# Patient Record
Sex: MALE | Race: WHITE | NOT HISPANIC OR LATINO | Employment: OTHER | URBAN - NONMETROPOLITAN AREA
[De-identification: names, ages, dates, MRNs, and addresses within clinical notes are randomized per-mention and may not be internally consistent; named-entity substitution may affect disease eponyms.]

---

## 2017-01-13 ENCOUNTER — ANTICOAGULATION VISIT (OUTPATIENT)
Dept: CARDIOLOGY | Facility: CLINIC | Age: 82
End: 2017-01-13

## 2017-01-13 LAB — INR PPP: 2.1 (ref 2–3)

## 2017-01-13 NOTE — PROGRESS NOTES
Per Huy Seals, PAC no dosage change, recheck level in 2-3 weeks.Patient's wife aware, verbalized ok. PH,LPN

## 2017-02-23 ENCOUNTER — DOCUMENTATION (OUTPATIENT)
Dept: CARDIOLOGY | Facility: CLINIC | Age: 82
End: 2017-02-23

## 2017-02-23 NOTE — PROGRESS NOTES
Called and spoke with wife, still in [place], to check on his INR and she stated he had had a fall recently and hurt some of his ribs, did labs and he was told they were all ok. Patient told her he would go breezy. And get INR drawn and he is to have results faxed to our office. PH,LPN

## 2017-02-24 ENCOUNTER — CONVERSION ENCOUNTER (OUTPATIENT)
Dept: CARDIOLOGY | Facility: CLINIC | Age: 82
End: 2017-02-24

## 2017-02-24 LAB — INR PPP: 1.7 (ref 2–3)

## 2017-02-25 LAB
INR PPP: 1.7 (ref 0.8–1.2)
PROTHROMBIN TIME: 17 SEC (ref 9.1–12)

## 2017-02-28 ENCOUNTER — ANTICOAGULATION VISIT (OUTPATIENT)
Dept: CARDIOLOGY | Facility: CLINIC | Age: 82
End: 2017-02-28

## 2017-02-28 NOTE — PROGRESS NOTES
INR 1.7 on 2-24-17 but never received results here in  Office, so called for results per patient inquiry. Per Huy Seals, PAC take 75 mg today then resume previous dosing and recheck level in 1 week. Patient's wife aware, verbalized ok. PH,LPN

## 2017-03-06 ENCOUNTER — CONVERSION ENCOUNTER (OUTPATIENT)
Dept: CARDIOLOGY | Facility: CLINIC | Age: 82
End: 2017-03-06

## 2017-03-06 DIAGNOSIS — Z95.2 AORTIC VALVE REPLACED: ICD-10-CM

## 2017-03-06 DIAGNOSIS — I48.91 ATRIAL FIBRILLATION, UNSPECIFIED TYPE (HCC): Primary | ICD-10-CM

## 2017-03-06 LAB — INR PPP: 1.7 (ref 2–3)

## 2017-03-07 ENCOUNTER — ANTICOAGULATION VISIT (OUTPATIENT)
Dept: CARDIOLOGY | Facility: CLINIC | Age: 82
End: 2017-03-07

## 2017-03-07 LAB
INR PPP: 1.7 (ref 0.8–1.2)
PROTHROMBIN TIME: 17.6 SEC (ref 9.1–12)

## 2017-03-07 NOTE — PROGRESS NOTES
Per Huy Seals, PAC change to 7.5 mg daily except 1/2 tab on thursdays and recheck level in 1 week. Patient's wife aware, verbalized ok. PH,LPN

## 2017-03-15 ENCOUNTER — CONVERSION ENCOUNTER (OUTPATIENT)
Dept: CARDIOLOGY | Facility: CLINIC | Age: 82
End: 2017-03-15

## 2017-03-15 LAB — INR PPP: 2.1 (ref 2–3)

## 2017-03-16 ENCOUNTER — ANTICOAGULATION VISIT (OUTPATIENT)
Dept: CARDIOLOGY | Facility: CLINIC | Age: 82
End: 2017-03-16

## 2017-03-16 LAB
INR PPP: 2.1 (ref 0.8–1.2)
PROTHROMBIN TIME: 21 SEC (ref 9.1–12)

## 2017-03-16 NOTE — PROGRESS NOTES
Per Huy Seals, PAC no dosage change, recheck level in 1-2 weeks. Patient's wife aware, verbalized ok. PH,LPN

## 2017-04-06 ENCOUNTER — CONVERSION ENCOUNTER (OUTPATIENT)
Dept: CARDIOLOGY | Facility: CLINIC | Age: 82
End: 2017-04-06

## 2017-04-06 LAB — INR PPP: 2.6 (ref 2–3)

## 2017-04-07 ENCOUNTER — ANTICOAGULATION VISIT (OUTPATIENT)
Dept: CARDIOLOGY | Facility: CLINIC | Age: 82
End: 2017-04-07

## 2017-04-07 LAB
INR PPP: 2.6 (ref 0.8–1.2)
PROTHROMBIN TIME: 25.8 SEC (ref 9.1–12)

## 2017-04-07 NOTE — PROGRESS NOTES
Per Huy Seals, PAC no dosage change, recheck level in 2-3 weeks. Patient aware, verbalized ok. Currently in [place], returning in May. PH,LPN

## 2017-04-13 ENCOUNTER — CONVERSION ENCOUNTER (OUTPATIENT)
Dept: CARDIOLOGY | Facility: CLINIC | Age: 82
End: 2017-04-13

## 2017-04-13 LAB — INR PPP: 2.2 (ref 2–3)

## 2017-04-14 LAB
INR PPP: 2.2 (ref 0.8–1.2)
PROTHROMBIN TIME: 22.4 SEC (ref 9.1–12)

## 2017-04-17 ENCOUNTER — ANTICOAGULATION VISIT (OUTPATIENT)
Dept: CARDIOLOGY | Facility: CLINIC | Age: 82
End: 2017-04-17

## 2017-04-17 NOTE — PROGRESS NOTES
Per Huy Seals, PAC. No dosage change, recheck level in 2-3 weeks. Patient aware, verbalized ok. PH,LPN

## 2017-05-16 LAB — INR PPP: 2.7 (ref 2–3)

## 2017-05-17 ENCOUNTER — ANTICOAGULATION VISIT (OUTPATIENT)
Dept: CARDIOLOGY | Facility: CLINIC | Age: 82
End: 2017-05-17

## 2017-05-17 LAB
INR PPP: 2.7 (ref 0.8–1.2)
PROTHROMBIN TIME: 27.3 SEC (ref 9.1–12)

## 2017-06-02 ENCOUNTER — OFFICE VISIT (OUTPATIENT)
Dept: CARDIOLOGY | Facility: CLINIC | Age: 82
End: 2017-06-02

## 2017-06-02 VITALS
SYSTOLIC BLOOD PRESSURE: 127 MMHG | HEART RATE: 86 BPM | DIASTOLIC BLOOD PRESSURE: 75 MMHG | BODY MASS INDEX: 29.98 KG/M2 | WEIGHT: 202.4 LBS | HEIGHT: 69 IN | OXYGEN SATURATION: 94 %

## 2017-06-02 DIAGNOSIS — I48.91 ATRIAL FIBRILLATION, UNSPECIFIED TYPE (HCC): ICD-10-CM

## 2017-06-02 DIAGNOSIS — R42 DIZZINESS: ICD-10-CM

## 2017-06-02 DIAGNOSIS — I49.5 TACHY-BRADY SYNDROME (HCC): Primary | ICD-10-CM

## 2017-06-02 DIAGNOSIS — I25.10 CORONARY ARTERY DISEASE DUE TO CALCIFIED CORONARY LESION: Primary | ICD-10-CM

## 2017-06-02 DIAGNOSIS — I25.10 CORONARY ARTERY DISEASE DUE TO CALCIFIED CORONARY LESION: ICD-10-CM

## 2017-06-02 DIAGNOSIS — Z95.2 S/P AVR (AORTIC VALVE REPLACEMENT): ICD-10-CM

## 2017-06-02 DIAGNOSIS — I10 ESSENTIAL HYPERTENSION: ICD-10-CM

## 2017-06-02 DIAGNOSIS — I48.20 CHRONIC ATRIAL FIBRILLATION (HCC): ICD-10-CM

## 2017-06-02 DIAGNOSIS — Z95.2 AORTIC VALVE REPLACED: ICD-10-CM

## 2017-06-02 DIAGNOSIS — I25.84 CORONARY ARTERY DISEASE DUE TO CALCIFIED CORONARY LESION: ICD-10-CM

## 2017-06-02 DIAGNOSIS — I25.84 CORONARY ARTERY DISEASE DUE TO CALCIFIED CORONARY LESION: Primary | ICD-10-CM

## 2017-06-02 PROBLEM — E78.5 HYPERLIPIDEMIA: Status: ACTIVE | Noted: 2017-06-02

## 2017-06-02 PROBLEM — R06.02 SHORTNESS OF BREATH: Status: ACTIVE | Noted: 2017-06-02

## 2017-06-02 PROBLEM — K57.90 DIVERTICULOSIS OF INTESTINE: Status: ACTIVE | Noted: 2017-06-02

## 2017-06-02 PROBLEM — K21.9 GASTROESOPHAGEAL REFLUX DISEASE: Status: ACTIVE | Noted: 2017-06-02

## 2017-06-02 PROBLEM — I77.9 DISORDER OF AORTA (HCC): Status: ACTIVE | Noted: 2017-06-02

## 2017-06-02 PROBLEM — I27.20 PULMONARY HYPERTENSION (HCC): Status: ACTIVE | Noted: 2017-06-02

## 2017-06-02 PROBLEM — R53.83 FATIGUE: Status: ACTIVE | Noted: 2017-06-02

## 2017-06-02 PROBLEM — R00.2 PALPITATIONS: Status: ACTIVE | Noted: 2017-06-02

## 2017-06-02 PROBLEM — I48.0 PAROXYSMAL ATRIAL FIBRILLATION (HCC): Status: ACTIVE | Noted: 2017-06-02

## 2017-06-02 PROBLEM — C61 MALIGNANT NEOPLASM OF PROSTATE (HCC): Status: ACTIVE | Noted: 2017-06-02

## 2017-06-02 PROCEDURE — 99213 OFFICE O/P EST LOW 20 MIN: CPT | Performed by: PHYSICIAN ASSISTANT

## 2017-06-02 PROCEDURE — 93288 INTERROG EVL PM/LDLS PM IP: CPT | Performed by: INTERNAL MEDICINE

## 2017-06-02 PROCEDURE — 93000 ELECTROCARDIOGRAM COMPLETE: CPT | Performed by: PHYSICIAN ASSISTANT

## 2017-06-02 RX ORDER — METHOCARBAMOL 750 MG/1
750 TABLET, FILM COATED ORAL AS NEEDED
COMMUNITY
Start: 2017-04-11 | End: 2017-10-24

## 2017-06-02 RX ORDER — OXYCODONE AND ACETAMINOPHEN 7.5; 325 MG/1; MG/1
TABLET ORAL
COMMUNITY
Start: 2017-05-23 | End: 2017-07-26 | Stop reason: ALTCHOICE

## 2017-06-02 NOTE — PROGRESS NOTES
Problem list     Subjective   Sonido Dodd is a 81 y.o. male     Chief Complaint   Patient presents with   • Dizziness     Problem List:     1. Coronary artery disease   1.1 Stenting to OM X 2 and LAD, 2007.  2. Atrial fibrillation   2.1 CHADS score of at least 2, patient is on anticoagulation therapy with Coumadin.  3. 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 Degenerative disc and joint disease.   5. Hypertension  6. Hyperlipidemia  7. Shortness of breath   8. Valvular Heart Disease.  8.1 Aortic valve replacement a #21 [employer] Trifecta valve.  The patient had concurrent VG to diagonal placement.      HPI  The patient presents back for evaluation after being out of the clinic for a period of approximately one to one and a half years.  He has not done as well recently.  He has started noticing increasing episodes of vertigo.  This is called a couple of separate falls which has now worsened his severe hip pain.  He also has dizziness compatible with orthostasis which seems far less significant to him that his vertigo.  He is planning to see Dr. Mejía for evaluation of his vertigo soon.  The patient did have a pacemaker interrogation today which indicated stable function.  He has approximately 9 years of battery life remaining.  The patient does have ongoing dyspnea which can be moderate at times.  He relates to no PND or orthopnea.  He has stable lower extremity edema.  He has no chest pain.  The patient has no further complaints otherwise at this time.    Current Outpatient Prescriptions   Medication Sig Dispense Refill   • amiodarone (PACERONE) 200 MG tablet Take 1 tablet by mouth Daily. 90 tablet 3   • aspirin 81 MG tablet Take 81 mg by mouth Daily.     • atorvastatin (LIPITOR) 40 MG tablet Take 1 tablet by mouth every night.     • methocarbamol (ROBAXIN) 750 MG tablet prn     • montelukast (SINGULAIR) 10 MG tablet Take 1 tablet by mouth daily.     • Multiple Vitamin (ONE-A-DAY  MENS) tablet Take 1 tablet by mouth daily.     • Omega-3 Fatty Acids (FISH OIL EXTRA STRENGTH) 1200 MG capsule Take  by mouth.     • oxyCODONE-acetaminophen (PERCOCET) 7.5-325 MG per tablet prn     • warfarin (COUMADIN) 7.5 MG tablet Take  by mouth. 7.5 mg daily except takes 1/2 tab on Thursday's       No current facility-administered medications for this visit.        Ondansetron    Past Medical History:   Diagnosis Date   • Cancer     malig. neoplasm of prostate   • Coronary artery disease    • GERD (gastroesophageal reflux disease)    • Hip discomfort     rt. hip s/p falls, spurs present per patient   • Hyperlipidemia    • Hypertension    • Pulmonary hypertension    • Transient cerebral ischemia        Social History     Social History   • Marital status:      Spouse name: N/A   • Number of children: N/A   • Years of education: N/A     Occupational History   • Not on file.     Social History Main Topics   • Smoking status: Never Smoker   • Smokeless tobacco: Not on file   • Alcohol use Defer      Comment: denied   • Drug use: No   • Sexual activity: Not on file     Other Topics Concern   • Not on file     Social History Narrative       Family History   Problem Relation Age of Onset   • Diabetes Mother    • Stroke Mother    • Cancer Father    • Cancer Sister    • Diabetes Brother    • Cancer Brother    • Other Brother      ACUTE MYOCARDIAL INFARCTION       Review of Systems   Constitutional: Positive for fatigue.   HENT: Positive for hearing loss.    Eyes: Positive for visual disturbance (reading glasses).   Respiratory: Negative.    Cardiovascular: Positive for leg swelling (mildly). Negative for chest pain and palpitations.   Gastrointestinal: Negative.    Endocrine: Negative.    Genitourinary: Negative.    Musculoskeletal: Positive for arthralgias, gait problem (rt. hip s/p falls, ambulates with walker) and myalgias.   Skin: Negative.    Allergic/Immunologic: Negative.    Neurological: Positive for  "dizziness and light-headedness.   Hematological: Bruises/bleeds easily.   Psychiatric/Behavioral: Positive for sleep disturbance.       Objective   /75 (BP Location: Right arm, Patient Position: Standing)  Pulse 86  Ht 69\" (175.3 cm)  Wt 202 lb 6.4 oz (91.8 kg)  SpO2 94%  BMI 29.89 kg/m2  Lab Results (most recent)     None        Physical Exam   Constitutional: He is oriented to person, place, and time. He appears well-developed and well-nourished. No distress.   HENT:   Head: Normocephalic and atraumatic.   Eyes: Conjunctivae and EOM are normal. Pupils are equal, round, and reactive to light.   Neck: Normal range of motion. Neck supple. No JVD present. No tracheal deviation present.   Cardiovascular: Normal rate, regular rhythm and intact distal pulses.    Murmur heard.   Systolic (Second RICS and LLSB.) murmur is present with a grade of 1/6   Pulses:       Dorsalis pedis pulses are 1+ on the right side, and 1+ on the left side.        Posterior tibial pulses are 1+ on the right side, and 1+ on the left side.   Pulmonary/Chest: Effort normal and breath sounds normal.   Abdominal: Soft. Bowel sounds are normal. He exhibits no distension and no mass. There is no tenderness. There is no rebound and no guarding.   Musculoskeletal: Normal range of motion. He exhibits edema. He exhibits no tenderness or deformity.   Neurological: He is alert and oriented to person, place, and time.   Skin: Skin is warm and dry. No rash noted. No erythema. No pallor.   Psychiatric: He has a normal mood and affect. His behavior is normal. Judgment and thought content normal.   Nursing note and vitals reviewed.        Procedure     ECG 12 Lead  Date/Time: 6/2/2017 9:51 AM  Performed by: CHAKA YBARRA  Authorized by: CHAKA YBARRA   Comments: Atrial pacing with ventricular sensing, normal axis, nonspecific ST and T-wave changes, no acute changes noted.               Assessment/Plan      Diagnosis Plan   1. Coronary artery " disease due to calcified coronary lesion  ECG 12 Lead    Adult Transthoracic Echo Complete    Duplex Carotid Ultrasound CAR   2. Essential hypertension  ECG 12 Lead    Adult Transthoracic Echo Complete    Duplex Carotid Ultrasound CAR   3. Atrial fibrillation, unspecified type  ECG 12 Lead    Adult Transthoracic Echo Complete    Duplex Carotid Ultrasound CAR   4. Dizziness  Adult Transthoracic Echo Complete    Duplex Carotid Ultrasound CAR   5. S/P AVR (aortic valve replacement)  Adult Transthoracic Echo Complete    Duplex Carotid Ultrasound CAR       I will schedule for an echo cardiogram to reevaluate aortic valve replacement, but especially given symptoms as above.  With dizziness and issues otherwise, the patient will be scheduled for carotid duplex.  I will make no changes in medications.  We'll see him back to review results the above studies and recommend further at that time.  Patient interrogation indicated stable function.  We will make no changes otherwise.

## 2017-06-27 DIAGNOSIS — Z95.2 AORTIC VALVE REPLACED: ICD-10-CM

## 2017-06-27 DIAGNOSIS — I48.91 ATRIAL FIBRILLATION, UNSPECIFIED TYPE (HCC): Primary | ICD-10-CM

## 2017-06-27 LAB — INR PPP: 1.8 (ref 2–3)

## 2017-06-28 ENCOUNTER — ANTICOAGULATION VISIT (OUTPATIENT)
Dept: CARDIOLOGY | Facility: CLINIC | Age: 82
End: 2017-06-28

## 2017-06-28 NOTE — PROGRESS NOTES
Per Huy Seals, PAC take extra 1/2 tab today (of 7.5) then resume previous dosing and recheck level in 2 weeks. Verbal orders read back and verified by Huy Seals, PAC. Patient aware, verbalized ok. PH,LPN

## 2017-07-03 ENCOUNTER — APPOINTMENT (OUTPATIENT)
Dept: CARDIOLOGY | Facility: HOSPITAL | Age: 82
End: 2017-07-03

## 2017-07-03 ENCOUNTER — HOSPITAL ENCOUNTER (OUTPATIENT)
Dept: CARDIOLOGY | Facility: HOSPITAL | Age: 82
End: 2017-07-03

## 2017-07-10 ENCOUNTER — APPOINTMENT (OUTPATIENT)
Dept: CARDIOLOGY | Facility: HOSPITAL | Age: 82
End: 2017-07-10

## 2017-07-12 ENCOUNTER — OUTSIDE FACILITY SERVICE (OUTPATIENT)
Dept: CARDIOLOGY | Facility: CLINIC | Age: 82
End: 2017-07-12

## 2017-07-12 ENCOUNTER — HOSPITAL ENCOUNTER (OUTPATIENT)
Dept: CARDIOLOGY | Facility: HOSPITAL | Age: 82
Discharge: HOME | End: 2017-07-12
Admitting: PHYSICIAN ASSISTANT

## 2017-07-12 ENCOUNTER — HOSPITAL ENCOUNTER (OUTPATIENT)
Dept: CARDIOLOGY | Facility: HOSPITAL | Age: 82
Discharge: HOME | End: 2017-07-12

## 2017-07-12 PROCEDURE — 93880 EXTRACRANIAL BILAT STUDY: CPT | Performed by: INTERNAL MEDICINE

## 2017-07-12 PROCEDURE — 93880 EXTRACRANIAL BILAT STUDY: CPT

## 2017-07-12 PROCEDURE — 93306 TTE W/DOPPLER COMPLETE: CPT | Performed by: INTERNAL MEDICINE

## 2017-07-12 PROCEDURE — 93306 TTE W/DOPPLER COMPLETE: CPT

## 2017-07-21 ENCOUNTER — TELEPHONE (OUTPATIENT)
Dept: CARDIOLOGY | Facility: CLINIC | Age: 82
End: 2017-07-21

## 2017-07-26 ENCOUNTER — OFFICE VISIT (OUTPATIENT)
Dept: CARDIOLOGY | Facility: CLINIC | Age: 82
End: 2017-07-26

## 2017-07-26 ENCOUNTER — ANTICOAGULATION VISIT (OUTPATIENT)
Dept: CARDIOLOGY | Facility: CLINIC | Age: 82
End: 2017-07-26

## 2017-07-26 VITALS
HEIGHT: 69 IN | HEART RATE: 85 BPM | OXYGEN SATURATION: 92 % | BODY MASS INDEX: 30.54 KG/M2 | DIASTOLIC BLOOD PRESSURE: 71 MMHG | WEIGHT: 206.2 LBS | SYSTOLIC BLOOD PRESSURE: 120 MMHG

## 2017-07-26 DIAGNOSIS — R06.02 SHORTNESS OF BREATH: ICD-10-CM

## 2017-07-26 DIAGNOSIS — Z95.2 S/P AVR (AORTIC VALVE REPLACEMENT): ICD-10-CM

## 2017-07-26 DIAGNOSIS — R53.83 OTHER FATIGUE: ICD-10-CM

## 2017-07-26 DIAGNOSIS — I25.10 CORONARY ARTERY DISEASE INVOLVING NATIVE CORONARY ARTERY OF NATIVE HEART WITHOUT ANGINA PECTORIS: Primary | ICD-10-CM

## 2017-07-26 LAB — INR PPP: 4.22 (ref 2–3)

## 2017-07-26 PROCEDURE — 99213 OFFICE O/P EST LOW 20 MIN: CPT | Performed by: PHYSICIAN ASSISTANT

## 2017-07-26 RX ORDER — HYDROCODONE BITARTRATE AND ACETAMINOPHEN 10; 325 MG/1; MG/1
1 TABLET ORAL AS NEEDED
COMMUNITY
Start: 2017-07-25 | End: 2018-06-06

## 2017-07-26 NOTE — PROGRESS NOTES
Problem list     Subjective   Sonido Dodd is a 81 y.o. male     Chief Complaint   Patient presents with   • Extremity Weakness     Legs     Problem List:      1. Coronary artery disease   1.1 Stenting to OM X 2 and LAD, 2007.  2. Atrial fibrillation   2.1 CHADS score of at least 2, patient is on anticoagulation therapy with Coumadin.  3. 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 Degenerative disc and joint disease.   5. Hypertension  6. Hyperlipidemia  7. Shortness of breath   8. Valvular Heart Disease.  8.1 Aortic valve replacement a #21 [employer] Trifecta valve.  The patient had concurrent VG to diagonal placement.      HPI  The patient presents back today at his request.  He presents because of significant muscle weakness and myalgias as well.  He reports that when trying to climb stairs or exert otherwise, he really has limitation because of muscle weakness.  He notes no significant myalgias, as above, which is more significant at night on average.  Strictly from cardiac standpoint, he denies chest pain.  He has stable dyspnea.  He relates to no PND or orthopnea.  He realized no rhythm disturbance issues.  He overall has done very well since aortic valve replacement as above.  The patient has no further complaints otherwise.    Current Outpatient Prescriptions   Medication Sig Dispense Refill   • amiodarone (PACERONE) 200 MG tablet Take 1 tablet by mouth Daily. 90 tablet 3   • aspirin 81 MG tablet Take 81 mg by mouth Daily.     • atorvastatin (LIPITOR) 40 MG tablet Take 1 tablet by mouth every night.     • HYDROcodone-acetaminophen (NORCO)  MG per tablet prn     • methocarbamol (ROBAXIN) 750 MG tablet prn     • montelukast (SINGULAIR) 10 MG tablet Take 1 tablet by mouth daily.     • Multiple Vitamin (ONE-A-DAY MENS) tablet Take 1 tablet by mouth daily.     • Omega-3 Fatty Acids (FISH OIL EXTRA STRENGTH) 1200 MG capsule Take  by mouth.     • warfarin (COUMADIN) 7.5 MG tablet  "Take  by mouth. 7.5 mg daily except takes 1/2 tab on Thursday's       No current facility-administered medications for this visit.        Ondansetron    Past Medical History:   Diagnosis Date   • Cancer     malig. neoplasm of prostate   • Coronary artery disease    • GERD (gastroesophageal reflux disease)    • Hip discomfort     rt. hip s/p falls, spurs present per patient   • Hyperlipidemia    • Hypertension    • Pulmonary hypertension    • Sleep apnea     uses c-pap   • Transient cerebral ischemia        Social History     Social History   • Marital status:      Spouse name: N/A   • Number of children: N/A   • Years of education: N/A     Occupational History   • Not on file.     Social History Main Topics   • Smoking status: Never Smoker   • Smokeless tobacco: Not on file   • Alcohol use Defer      Comment: denied   • Drug use: No   • Sexual activity: Not on file     Other Topics Concern   • Not on file     Social History Narrative       Family History   Problem Relation Age of Onset   • Diabetes Mother    • Stroke Mother    • Cancer Father    • Cancer Sister    • Diabetes Brother    • Cancer Brother    • Other Brother      ACUTE MYOCARDIAL INFARCTION       Review of Systems   Constitutional: Positive for fatigue.   HENT: Negative.    Eyes: Positive for visual disturbance (reading glasses and distance).   Respiratory: Positive for shortness of breath.    Cardiovascular: Negative.    Gastrointestinal: Negative.    Endocrine: Negative.    Genitourinary: Negative.    Musculoskeletal: Positive for arthralgias and myalgias.   Skin: Negative.    Allergic/Immunologic: Negative.    Neurological: Positive for dizziness, weakness (in legs) and light-headedness.   Hematological: Bruises/bleeds easily.   Psychiatric/Behavioral: Positive for sleep disturbance.       Objective   /71 (BP Location: Left arm, Patient Position: Sitting)  Pulse 85  Ht 69\" (175.3 cm)  Wt 206 lb 3.2 oz (93.5 kg)  SpO2 92%  BMI 30.45 " kg/m2  Lab Results (most recent)     None        Physical Exam   Constitutional: He is oriented to person, place, and time. He appears well-developed and well-nourished. No distress.   HENT:   Head: Normocephalic and atraumatic.   Eyes: Conjunctivae and EOM are normal. Pupils are equal, round, and reactive to light.   Neck: Normal range of motion. Neck supple. No JVD present. No tracheal deviation present.   Cardiovascular: Normal rate, regular rhythm and intact distal pulses.    Murmur heard.   Systolic (Second RICS and LLSB.) murmur is present with a grade of 1/6   Pulses:       Dorsalis pedis pulses are 1+ on the right side, and 1+ on the left side.        Posterior tibial pulses are 1+ on the right side, and 1+ on the left side.   Pulmonary/Chest: Effort normal and breath sounds normal.   Abdominal: Soft. Bowel sounds are normal. He exhibits no distension and no mass. There is no tenderness. There is no rebound and no guarding.   Musculoskeletal: Normal range of motion. He exhibits edema. He exhibits no tenderness or deformity.   Neurological: He is alert and oriented to person, place, and time.   Skin: Skin is warm and dry. No rash noted. No erythema. No pallor.   Psychiatric: He has a normal mood and affect. His behavior is normal. Judgment and thought content normal.   Nursing note and vitals reviewed.        Procedure   Procedures       Assessment/Plan      Diagnosis Plan   1. Coronary artery disease involving native coronary artery of native heart without angina pectoris  CBC & Differential    Comprehensive Metabolic Panel    TSH    CBC & Differential    Comprehensive Metabolic Panel    TSH   2. S/P AVR (aortic valve replacement)  CBC & Differential    Comprehensive Metabolic Panel    TSH    CBC & Differential    Comprehensive Metabolic Panel    TSH   3. Other fatigue  CBC & Differential    Comprehensive Metabolic Panel    TSH    CBC & Differential    Comprehensive Metabolic Panel    TSH   4. Shortness of  breath  CBC & Differential    Comprehensive Metabolic Panel    TSH    CBC & Differential    Comprehensive Metabolic Panel    TSH       I have asked that the patient hold statin therapy because of his degree of myalgias and muscle weakness.  He will call back in one to 2 weeks and report symptoms off of statin therapy.  If improvement, we will consider alternate statin therapy.  If no improvement is noted, he will resume previous Lipitor dosing and we will search for other causes of his muscle weakness.  In the interim, because weakness, I will schedule for labs as above.  Otherwise, recent studies including echo and carotid duplex were unremarkable.  He had a normally functioning aortic valve replacement.  Systolic function was normal by echo.  Carotid duplex indicated nonobstructive disease bilaterally, which we will be treating him medically.  Further pending all the above.

## 2017-07-26 NOTE — PROGRESS NOTES
Per Huy Seals, PAC hold today's dose then resume previous dosing and recheck level in 2 weeks. Verbal orders read back and verified by Huy Seals, PAC. Patient aware, verbalized ok. PH,LPN

## 2017-08-15 ENCOUNTER — ANTICOAGULATION VISIT (OUTPATIENT)
Dept: CARDIOLOGY | Facility: CLINIC | Age: 82
End: 2017-08-15

## 2017-08-15 LAB — INR PPP: 2.7 (ref 2–3)

## 2017-08-15 NOTE — PROGRESS NOTES
Per Huy Seals, PAC no dosage change, recheck level in 2 weeks. Verbal orders read back and verified by LINDSEY Briceno. Patient aware, verbalized ok. PH,LPN

## 2017-09-06 ENCOUNTER — OFFICE VISIT (OUTPATIENT)
Dept: CARDIOLOGY | Facility: CLINIC | Age: 82
End: 2017-09-06

## 2017-09-06 VITALS
OXYGEN SATURATION: 94 % | HEART RATE: 85 BPM | DIASTOLIC BLOOD PRESSURE: 71 MMHG | WEIGHT: 211.2 LBS | BODY MASS INDEX: 31.28 KG/M2 | SYSTOLIC BLOOD PRESSURE: 119 MMHG | HEIGHT: 69 IN

## 2017-09-06 DIAGNOSIS — Z95.0 PACEMAKER: ICD-10-CM

## 2017-09-06 DIAGNOSIS — Z95.2 S/P AVR (AORTIC VALVE REPLACEMENT): ICD-10-CM

## 2017-09-06 DIAGNOSIS — I10 ESSENTIAL HYPERTENSION: ICD-10-CM

## 2017-09-06 DIAGNOSIS — I48.0 PAROXYSMAL ATRIAL FIBRILLATION (HCC): Primary | ICD-10-CM

## 2017-09-06 PROCEDURE — 99213 OFFICE O/P EST LOW 20 MIN: CPT | Performed by: PHYSICIAN ASSISTANT

## 2017-09-06 NOTE — PROGRESS NOTES
Problem list     Subjective   Sonido Dodd is a 81 y.o. male     Chief Complaint   Patient presents with   • Follow-up     patient appears in office today for test results (carotid/echo) ; patient denies CP/Palpitations   • Shortness of Breath     patient states he has SOA on exertion only   Problem List:      1. Coronary artery disease   1.1 Stenting to OM X 2 and LAD, 2007.  2. Atrial fibrillation   2.1 CHADS score of at least 2, patient is on anticoagulation therapy with Coumadin.  3. 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 Degenerative disc and joint disease.   5. Hypertension  6. Hyperlipidemia  7. Shortness of breath   8. Valvular Heart Disease.  8.1 Aortic valve replacement a #21 [employer] Trifecta valve.  The patient had concurrent VG to diagonal placement.        HPI  The patient presents back for follow-up of echo and carotid duplex findings.  Echo indicated preserved systolic function with normally functioning aortic valve replacement.  Parameters are stable for this gentleman otherwise.  Carotid duplex suggested nonobstructive disease in either carotid systems.  The patient held statin therapy after last appointment and had no improvement in his weakness.  He has one had restarted that.  Currently, the patient has no chest pain.  He reports stable dyspnea.  He has no symptoms of dysrhythmias.  He feels that he is doing well otherwise and has no further complaints.    Current Outpatient Prescriptions   Medication Sig Dispense Refill   • amiodarone (PACERONE) 200 MG tablet Take 1 tablet by mouth Daily. 90 tablet 3   • aspirin 81 MG tablet Take 81 mg by mouth Daily.     • HYDROcodone-acetaminophen (NORCO)  MG per tablet Take 1 tablet by mouth As Needed for Severe Pain  (pain only). prn     • methocarbamol (ROBAXIN) 750 MG tablet Take 750 mg by mouth As Needed. prn     • Multiple Vitamin (ONE-A-DAY MENS) tablet Take 1 tablet by mouth daily.     • Omega-3 Fatty Acids  (FISH OIL EXTRA STRENGTH) 1200 MG capsule Take  by mouth.     • warfarin (COUMADIN) 7.5 MG tablet Take  by mouth. 7.5 mg daily except takes 1/2 tab on Thursday's       No current facility-administered medications for this visit.        Ondansetron    Past Medical History:   Diagnosis Date   • Cancer     malig. neoplasm of prostate   • Coronary artery disease    • GERD (gastroesophageal reflux disease)    • Hip discomfort     rt. hip s/p falls, spurs present per patient   • Hyperlipidemia    • Hypertension    • Pulmonary hypertension    • Sleep apnea     uses c-pap   • Transient cerebral ischemia        Social History     Social History   • Marital status:      Spouse name: N/A   • Number of children: N/A   • Years of education: N/A     Occupational History   • Not on file.     Social History Main Topics   • Smoking status: Never Smoker   • Smokeless tobacco: Never Used   • Alcohol use Defer      Comment: denied   • Drug use: No   • Sexual activity: Defer     Other Topics Concern   • Not on file     Social History Narrative       Family History   Problem Relation Age of Onset   • Diabetes Mother    • Stroke Mother    • Cancer Father    • Cancer Sister    • Diabetes Brother    • Cancer Brother    • Other Brother      ACUTE MYOCARDIAL INFARCTION       Review of Systems   Constitutional: Negative.  Negative for fatigue.   HENT: Negative.    Eyes: Positive for visual disturbance (wears glasses PRN).   Respiratory: Positive for apnea (SHAUNA with CPAP use) and shortness of breath (on exertion only). Negative for cough, chest tightness and wheezing.    Cardiovascular: Negative.  Negative for chest pain (denies CP), palpitations (denies palpitations) and leg swelling.   Gastrointestinal: Negative.  Negative for abdominal pain, nausea and vomiting.   Endocrine: Negative.  Negative for cold intolerance, heat intolerance, polyphagia and polyuria.   Genitourinary: Negative.  Negative for difficulty urinating, frequency and  "urgency.   Musculoskeletal: Positive for arthralgias (back) and back pain (due to arthritis). Negative for myalgias, neck pain and neck stiffness.   Skin: Negative.  Negative for rash and wound.   Allergic/Immunologic: Positive for environmental allergies (seasonal allergies). Negative for food allergies.   Neurological: Negative.  Negative for dizziness, weakness, light-headedness and headaches.   Hematological: Bruises/bleeds easily (bruises and bleeds easily).   Psychiatric/Behavioral: Negative for agitation, confusion and sleep disturbance (denies waking up smothering). The patient is not nervous/anxious.        Objective   /71 (BP Location: Left arm, Patient Position: Sitting)  Pulse 85  Ht 69\" (175.3 cm)  Wt 211 lb 3.2 oz (95.8 kg)  SpO2 94%  BMI 31.19 kg/m2  Lab Results (most recent)     None        Physical Exam   Constitutional: He is oriented to person, place, and time. He appears well-developed and well-nourished. No distress.   HENT:   Head: Normocephalic and atraumatic.   Eyes: Conjunctivae and EOM are normal. Pupils are equal, round, and reactive to light.   Neck: Normal range of motion. Neck supple. No JVD present. No tracheal deviation present.   Cardiovascular: Normal rate, regular rhythm and intact distal pulses.    Murmur heard.   Systolic (Second RICS and LLSB.) murmur is present with a grade of 1/6   Pulses:       Dorsalis pedis pulses are 1+ on the right side, and 1+ on the left side.        Posterior tibial pulses are 1+ on the right side, and 1+ on the left side.   Pulmonary/Chest: Effort normal and breath sounds normal.   Abdominal: Soft. Bowel sounds are normal. He exhibits no distension and no mass. There is no tenderness. There is no rebound and no guarding.   Musculoskeletal: Normal range of motion. He exhibits edema. He exhibits no tenderness or deformity.   Neurological: He is alert and oriented to person, place, and time.   Skin: Skin is warm and dry. No rash noted. No " erythema. No pallor.   Psychiatric: He has a normal mood and affect. His behavior is normal. Judgment and thought content normal.   Nursing note and vitals reviewed.        Procedure   Procedures       Assessment/Plan      Diagnosis Plan   1. Paroxysmal atrial fibrillation     2. S/P AVR (aortic valve replacement)     3. Pacemaker     4. Essential hypertension       The patient seems to be doing well.  Echo findings suggest normally functioning aortic valve replacement.  EF is normal.  He has stable findings otherwise.  Carotid duplex indicates nonobstructive disease and bilateral carotid systems.  Last pacemaker interrogation indicates normally functioning pacemaker.  The patient is on appropriate medications.  He seems to be feeling well at this time from cardiac standpoint.  I will make no changes and see the patient back in 6 months, sooner if indicated by course.

## 2017-09-07 ENCOUNTER — TELEPHONE (OUTPATIENT)
Dept: CARDIOLOGY | Facility: CLINIC | Age: 82
End: 2017-09-07

## 2017-09-07 DIAGNOSIS — I48.0 PAROXYSMAL ATRIAL FIBRILLATION (HCC): Primary | ICD-10-CM

## 2017-09-07 DIAGNOSIS — I25.10 CORONARY ARTERY DISEASE INVOLVING NATIVE CORONARY ARTERY OF NATIVE HEART WITHOUT ANGINA PECTORIS: ICD-10-CM

## 2017-09-07 DIAGNOSIS — Z79.01 ANTICOAGULATED ON COUMADIN: ICD-10-CM

## 2017-09-07 NOTE — TELEPHONE ENCOUNTER
----- Message from Zane Weller sent at 9/7/2017 10:36 AM EDT -----  Contact: EVELYN  PATIENT WAS SEEN BY CHAKA YBARRA ON 9/6/17 AND FAILED TO GET A STANDING ORDER FOR HIS COUMADIN TO BE CHECKED AND IS REQUESTING A LAB ORDER.  HE WOULD LIKE THE ORDER TO BE MAILED TO HIM.    EVELYN RHODES  [address] [place] [address]    IF ANY QUESTIONS PLEASE CALL EVELYN [phone]

## 2017-09-25 ENCOUNTER — DOCUMENTATION (OUTPATIENT)
Dept: CARDIOLOGY | Facility: CLINIC | Age: 82
End: 2017-09-25

## 2017-09-25 NOTE — PROGRESS NOTES
Called and spoke with wife and reminded her iNR way past due and she stated she would get him to have it drawn. PH,LPN

## 2017-09-27 LAB — INR PPP: 2.4 (ref 2–3)

## 2017-09-28 ENCOUNTER — ANTICOAGULATION VISIT (OUTPATIENT)
Dept: CARDIOLOGY | Facility: CLINIC | Age: 82
End: 2017-09-28

## 2017-09-28 NOTE — PROGRESS NOTES
NO DOSAGE CHANGE AND RECHECK LEVEL IN 3 WEEKS PER CHAKA YBARRA, PAC. VERBAL ORDERS READ BACK AND VERIFIED BY CHAKA YBARRA, PAC. PATIENT AWARE VERBALIZED OK. PH,LPN

## 2017-10-24 ENCOUNTER — OFFICE VISIT (OUTPATIENT)
Dept: CARDIOLOGY | Facility: CLINIC | Age: 82
End: 2017-10-24

## 2017-10-24 VITALS
HEART RATE: 85 BPM | WEIGHT: 214.4 LBS | HEIGHT: 69 IN | SYSTOLIC BLOOD PRESSURE: 164 MMHG | BODY MASS INDEX: 31.76 KG/M2 | DIASTOLIC BLOOD PRESSURE: 88 MMHG | OXYGEN SATURATION: 95 %

## 2017-10-24 DIAGNOSIS — Z95.2 S/P AVR (AORTIC VALVE REPLACEMENT): ICD-10-CM

## 2017-10-24 DIAGNOSIS — R00.2 PALPITATIONS: ICD-10-CM

## 2017-10-24 DIAGNOSIS — E78.5 HYPERLIPIDEMIA, UNSPECIFIED HYPERLIPIDEMIA TYPE: ICD-10-CM

## 2017-10-24 DIAGNOSIS — Z95.0 PACEMAKER: ICD-10-CM

## 2017-10-24 DIAGNOSIS — I25.10 TRIPLE VESSEL CORONARY ARTERY DISEASE: Primary | ICD-10-CM

## 2017-10-24 DIAGNOSIS — I48.0 PAROXYSMAL ATRIAL FIBRILLATION (HCC): ICD-10-CM

## 2017-10-24 DIAGNOSIS — R06.02 SHORTNESS OF BREATH: ICD-10-CM

## 2017-10-24 LAB — INR PPP: 2.2 (ref 2–3)

## 2017-10-24 PROCEDURE — 99214 OFFICE O/P EST MOD 30 MIN: CPT | Performed by: NURSE PRACTITIONER

## 2017-10-24 RX ORDER — ATORVASTATIN CALCIUM 40 MG/1
40 TABLET, FILM COATED ORAL DAILY
COMMUNITY
Start: 2017-09-15 | End: 2020-01-01 | Stop reason: ALTCHOICE

## 2017-10-24 RX ORDER — LEVOTHYROXINE SODIUM 0.05 MG/1
50 TABLET ORAL DAILY
COMMUNITY
Start: 2017-09-15

## 2017-10-24 RX ORDER — MONTELUKAST SODIUM 10 MG/1
10 TABLET ORAL DAILY
COMMUNITY
Start: 2017-09-15

## 2017-10-24 NOTE — PROGRESS NOTES
Subjective   Sonido Dodd is a 81 y.o. male     Chief Complaint   Patient presents with   • Atrial Fibrillation     patient states he saw PCP this AM and received EKG ; PCP notified patient he was in atrial fibrillation    • Palpitations     patient states he has noticed increase in palpitations       HPI    Problem List:      1. Coronary artery disease   1.1 Stenting to OM X 2 and LAD, 2007.  2. Atrial fibrillation   2.1 CHADS score of at least 2, patient is on anticoagulation therapy with Coumadin.  3. Conduction system disease   3.1 PPM Dual Chamber [facility] F 2/2/16  4. Degenerative disc and joint disease.   5. Hypertension  6. Hyperlipidemia  7. Shortness of breath   7.1 Echo 7/12/17-moderate LVH, EF greater than 65%, diastolic dysfunction 2, aortic VTI 1.2; porcine bioprosthetic aortic valve is present; that was appears to function normally; trace MR  8. Valvular Heart Disease.  8.1 Aortic valve replacement a #21 [facility] F Trifecta valve.  The patient had concurrent VG to diagonal placement.   9.  Carotid artery disease  9.1 carotid artery ultrasound 7/12/17-16-49% stenosis mild bifurcation bilaterally, less than or equal to 15% stenosis R ICA, 16-49% stenosis and LICA, antegrade flow disturbance in both her tumor arteries    Patient is an 81-year-old male who presents today for follow-up due to EKG at PCPs office.  He denies any chest pain or pressure.  He says he's had increase in palpitations since over the weekend.  He says he gets a little into his back.  He denies any dizziness, presyncope, syncope, orthopnea, PND or edema.  He says he has had increase in shortness of breath with activity.  He denies any signs of bleeding or cerebral ischemic events.    Patient has been on amiodarone for his A. fib however he had elevated thyroid level which he has been put on levothyroxin for however his viral level still slightly elevated at 6.050.  Due to this we will try to get him off of the amiodarone and onto  flecainide.  EKG from his PCP axis showed sinus rhythm with PVCs.    Current Outpatient Prescriptions   Medication Sig Dispense Refill   • aspirin 81 MG tablet Take 81 mg by mouth Daily.     • atorvastatin (LIPITOR) 40 MG tablet Take 40 mg by mouth Daily.     • HYDROcodone-acetaminophen (NORCO)  MG per tablet Take 1 tablet by mouth As Needed for Severe Pain  (pain only). prn     • levothyroxine (SYNTHROID, LEVOTHROID) 50 MCG tablet Take 50 mcg by mouth Daily.     • montelukast (SINGULAIR) 10 MG tablet Take 10 mg by mouth Daily.     • Multiple Vitamin (ONE-A-DAY MENS) tablet Take 1 tablet by mouth daily.     • Omega-3 Fatty Acids (FISH OIL EXTRA STRENGTH) 1200 MG capsule Take  by mouth.     • warfarin (COUMADIN) 7.5 MG tablet Take  by mouth. 7.5 mg daily except takes 1/2 tab on Thursday's       No current facility-administered medications for this visit.        ALLERGIES    Ondansetron    Past Medical History:   Diagnosis Date   • Cancer     malig. neoplasm of prostate   • Coronary artery disease    • GERD (gastroesophageal reflux disease)    • Hip discomfort     rt. hip s/p falls, spurs present per patient   • Hyperlipidemia    • Hypertension    • Pulmonary hypertension    • Sleep apnea     uses c-pap   • Transient cerebral ischemia        Social History     Social History   • Marital status:      Spouse name: N/A   • Number of children: N/A   • Years of education: N/A     Occupational History   • Not on file.     Social History Main Topics   • Smoking status: Never Smoker   • Smokeless tobacco: Never Used   • Alcohol use Defer      Comment: denied   • Drug use: No   • Sexual activity: Defer     Other Topics Concern   • Not on file     Social History Narrative       Family History   Problem Relation Age of Onset   • Diabetes Mother    • Stroke Mother    • Cancer Father    • Cancer Sister    • Diabetes Brother    • Cancer Brother    • Other Brother      ACUTE MYOCARDIAL INFARCTION       Review of Systems  "  Constitutional: Negative for diaphoresis and fatigue.   HENT: Positive for rhinorrhea (due to seasonal allergies). Negative for congestion, sinus pain, sinus pressure, sneezing and sore throat.    Eyes: Positive for visual disturbance (wears reading glasses).   Respiratory: Positive for shortness of breath (easily SOA). Negative for cough, chest tightness and wheezing.    Cardiovascular: Positive for palpitations (increase in palpitations). Negative for chest pain (denies CP) and leg swelling.   Gastrointestinal: Negative for abdominal pain, nausea and vomiting.   Endocrine: Negative for cold intolerance, heat intolerance, polyphagia and polyuria.   Genitourinary: Negative for difficulty urinating, frequency and urgency.   Musculoskeletal: Positive for arthralgias (hands). Negative for back pain, myalgias, neck pain and neck stiffness.   Skin: Negative.  Negative for rash and wound.   Allergic/Immunologic: Negative for environmental allergies and food allergies.   Neurological: Negative for dizziness, weakness, light-headedness and headaches.   Hematological: Bruises/bleeds easily (bruises and bleeds easily).   Psychiatric/Behavioral: Negative for agitation, confusion and sleep disturbance. The patient is nervous/anxious (occasional nervous/anxiousness).        Objective   /88 (BP Location: Left arm, Patient Position: Sitting)  Pulse 85  Ht 69\" (175.3 cm)  Wt 214 lb 6.4 oz (97.3 kg)  SpO2 95%  BMI 31.66 kg/m2  Vitals:    10/24/17 0829   BP: 164/88   BP Location: Left arm   Patient Position: Sitting   Pulse: 85   SpO2: 95%   Weight: 214 lb 6.4 oz (97.3 kg)   Height: 69\" (175.3 cm)      Lab Results (most recent)     None        Physical Exam   Constitutional: He is oriented to person, place, and time. Vital signs are normal. He appears well-developed and well-nourished. He is active and cooperative.   HENT:   Head: Normocephalic.   Eyes: Lids are normal.   Neck: Normal carotid pulses, no hepatojugular " reflux and no JVD present. Carotid bruit is not present.   Cardiovascular: Normal rate and regular rhythm.   Occasional extrasystoles are present.   Murmur heard.   Systolic (LLSB) murmur is present   Pulses:       Radial pulses are 2+ on the right side, and 2+ on the left side.        Dorsalis pedis pulses are 2+ on the right side, and 2+ on the left side.        Posterior tibial pulses are 2+ on the right side, and 2+ on the left side.   No edema BLE.    Pulmonary/Chest: Effort normal and breath sounds normal.   Abdominal: Normal appearance and bowel sounds are normal.   Neurological: He is alert and oriented to person, place, and time.   Skin: Skin is warm, dry and intact.   PPM ROSELINE    Psychiatric: He has a normal mood and affect. His speech is normal and behavior is normal. Judgment and thought content normal. Cognition and memory are normal.       Procedure   Procedures         Assessment/Plan      Diagnosis Plan   1. Triple vessel coronary artery disease  Stress Test With Myocardial Perfusion One Day    Stress Test With Myocardial Perfusion One Day   2. Paroxysmal atrial fibrillation  Stress Test With Myocardial Perfusion One Day    Stress Test With Myocardial Perfusion One Day   3. S/P AVR (aortic valve replacement)     4. Pacemaker     5. Hyperlipidemia, unspecified hyperlipidemia type  Stress Test With Myocardial Perfusion One Day    Stress Test With Myocardial Perfusion One Day   6. Palpitations     7. Shortness of breath         Return Nurse Visit 2 Weeks on [facility] EKG/VS.    CAD-patient is on aspirin and statin.  CAD/A. fib/hyperlipidemia/shortness of breath/palpitations/shortness of breath-patient will have stress test.  Need to rule out ischemia prior to starting on flecainide.  A. fib-patient takes amiodarone at night so he has not taken it yet today.  He will stop taking amiodarone as of today and he will come back in 2 weeks for nurse visit for EKG and vital signs.  His point we should hopefully have  the results of the stress test and start him on flecainide.  I did advise will also recheck his thyroid level once amiodarone is out of his system to make sure he still needs medication.  Hyperlipidemia-patient is on Lipitor monitor by PCP.  He did bring in lab work today and it was excellent.  Patient will continue his medication regimen otherwise for now.  We will make his regular follow-up after he has his nurse visit in 2 weeks.     EKG from Dr. Reed office was NSR with PVCs, not afib.

## 2017-10-24 NOTE — PATIENT INSTRUCTIONS
Atrial Fibrillation  Atrial fibrillation is a type of irregular or rapid heartbeat (arrhythmia). In atrial fibrillation, the heart quivers continuously in a chaotic pattern. This occurs when parts of the heart receive disorganized signals that make the heart unable to pump blood normally. This can increase the risk for stroke, heart failure, and other heart-related conditions. There are different types of atrial fibrillation, including:  · Paroxysmal atrial fibrillation. This type starts suddenly, and it usually stops on its own shortly after it starts.  · Persistent atrial fibrillation. This type often lasts longer than a week. It may stop on its own or with treatment.  · Long-lasting persistent atrial fibrillation. This type lasts longer than 12 months.  · Permanent atrial fibrillation. This type does not go away.  Talk with your health care provider to learn about the type of atrial fibrillation that you have.  CAUSES  This condition is caused by some heart-related conditions or procedures, including:  · A heart attack.  · Coronary artery disease.  · Heart failure.  · Heart valve conditions.  · High blood pressure.  · Inflammation of the sac that surrounds the heart (pericarditis).  · Heart surgery.  · Certain heart rhythm disorders, such as Powers-Parkinson-White syndrome.  Other causes include:  · Pneumonia.  · Obstructive sleep apnea.  · Blockage of an artery in the lungs (pulmonary embolism, or PE).  · Lung cancer.  · Chronic lung disease.  · Thyroid problems, especially if the thyroid is overactive (hyperthyroidism).  · Caffeine.  · Excessive alcohol use or illegal drug use.  · Use of some medicines, including certain decongestants and diet pills.  Sometimes, the cause cannot be found.  RISK FACTORS  This condition is more likely to develop in:  · People who are older in age.  · People who smoke.  · People who have diabetes mellitus.  · People who are overweight (obese).  · Athletes who exercise  vigorously.  SYMPTOMS  Symptoms of this condition include:  · A feeling that your heart is beating rapidly or irregularly.  · A feeling of discomfort or pain in your chest.  · Shortness of breath.  · Sudden light-headedness or weakness.  · Getting tired easily during exercise.  In some cases, there are no symptoms.  DIAGNOSIS  Your health care provider may be able to detect atrial fibrillation when taking your pulse. If detected, this condition may be diagnosed with:  · An electrocardiogram (ECG).  · A Holter monitor test that records your heartbeat patterns over a 24-hour period.  · Transthoracic echocardiogram (TTE) to evaluate how blood flows through your heart.  · Transesophageal echocardiogram (JACQUELINE) to view more detailed images of your heart.  · A stress test.  · Imaging tests, such as a CT scan or chest X-ray.  · Blood tests.  TREATMENT  The main goals of treatment are to prevent blood clots from forming and to keep your heart beating at a normal rate and rhythm. The type of treatment that you receive depends on many factors, such as your underlying medical conditions and how you feel when you are experiencing atrial fibrillation.  This condition may be treated with:  · Medicine to slow down the heart rate, bring the heart's rhythm back to normal, or prevent clots from forming.  · Electrical cardioversion. This is a procedure that resets your heart's rhythm by delivering a controlled, low-energy shock to the heart through your skin.  · Different types of ablation, such as catheter ablation, catheter ablation with pacemaker, or surgical ablation. These procedures destroy the heart tissues that send abnormal signals. When the pacemaker is used, it is placed under your skin to help your heart beat in a regular rhythm.  HOME CARE INSTRUCTIONS  · Take over-the counter and prescription medicines only as told by your health care provider.  · If your health care provider prescribed a blood-thinning medicine  (anticoagulant), take it exactly as told. Taking too much blood-thinning medicine can cause bleeding. If you do not take enough blood-thinning medicine, you will not have the protection that you need against stroke and other problems.  · Do not use tobacco products, including cigarettes, chewing tobacco, and e-cigarettes. If you need help quitting, ask your health care provider.  · If you have obstructive sleep apnea, manage your condition as told by your health care provider.  · Do not drink alcohol.  · Do not drink beverages that contain caffeine, such as coffee, soda, and tea.  · Maintain a healthy weight. Do not use diet pills unless your health care provider approves. Diet pills may make heart problems worse.  · Follow diet instructions as told by your health care provider.  · Exercise regularly as told by your health care provider.  · Keep all follow-up visits as told by your health care provider. This is important.  PREVENTION  · Avoid drinking beverages that contain caffeine or alcohol.  · Avoid certain medicines, especially medicines that are used for breathing problems.  · Avoid certain herbs and herbal medicines, such as those that contain ephedra or ginseng.  · Do not use illegal drugs, such as cocaine and amphetamines.  · Do not smoke.  · Manage your high blood pressure.  SEEK MEDICAL CARE IF:  · You notice a change in the rate, rhythm, or strength of your heartbeat.  · You are taking an anticoagulant and you notice increased bruising.  · You tire more easily when you exercise or exert yourself.  SEEK IMMEDIATE MEDICAL CARE IF:  · You have chest pain, abdominal pain, sweating, or weakness.  · You feel nauseous.  · You notice blood in your vomit, bowel movement, or urine.  · You have shortness of breath.  · You suddenly have swollen feet and ankles.  · You feel dizzy.  · You have sudden weakness or numbness of the face, arm, or leg, especially on one side of the body.  · You have trouble speaking,  trouble understanding, or both (aphasia).  · Your face or your eyelid droops on one side.  These symptoms may represent a serious problem that is an emergency. Do not wait to see if the symptoms will go away. Get medical help right away. Call your local emergency services (911 in the [place] Do not drive yourself to the hospital.     This information is not intended to replace advice given to you by your health care provider. Make sure you discuss any questions you have with your health care provider.     Document Released: 12/18/2006 Document Revised: 09/07/2016 Document Reviewed: 04/13/2016  [employer] Interactive Patient Education ©2017 [employer]  Premature Atrial Contraction  Premature atrial contractions (PACs) happen when your heart beats before it has had time to fill with blood. Your heart then has to pause until it can fill with blood for the next beat. This causes the next beat to be more forceful. PACs are also called skipped heartbeats because it may feel like your heart stops for a second.   Your heart has four chambers. There are two upper chambers (atria) and two lower chambers (ventricles). All the chambers need to work together to pump blood properly. Electrical signals spread across your heart and make all the chambers beat together. The signal to beat starts in your atria. If the atria fire a bit early, you may have a PAC.   CAUSES   The cause of a PAC is often unknown. PACs are sometimes caused by heart disease or injury.  RISK FACTORS  PACs are more common in children and older people. Other risk factors that may trigger PACs include:  · Caffeine.  · Stress.  · Fatigue.  · Alcohol.  · Smoking.  · Stimulant drugs. These may be prescription or illegal drugs.  · Heart disease.  SIGNS AND SYMPTOMS  PACs are very common, especially in children and people 50 years and older. PACs do not cause dizziness, shortness of breath, or chest pain. The only symptom of a PAC is the sensation of a skipped or  fluttering heartbeat.   DIAGNOSIS   Your health care provider can diagnose PAC based on the description of your symptom. Your health care provider may also:  · Perform a physical exam to listen to your heart. Your heart may sound normal during this exam.  · Perform tests to rule out other conditions. These tests may include an electrical tracing of your heart called electrocardiogram (ECG). You may need to wear a portable ECG machine (Holter monitor) that records your heart for 24 hours or more.  TREATMENT   In most cases, PACs do not need to be treated. If you have frequent PACs that are caused by heart disease, you may be treated for the underlying condition.  HOME CARE INSTRUCTIONS  · Do not use any tobacco products, including cigarettes, chewing tobacco, or electronic cigarettes. If you need help quitting, ask your health care provider.  · Limit alcohol intake to no more than 1 drink per day for nonpregnant women and 2 drinks per day for men. One drink equals 12 ounces of beer, 5 ounces of wine, or 1½ ounces of hard liquor.  · Limit the amount of caffeine you take in.  · Do not use illegal drugs.  · Get at least 8 hours of sleep every night.  · Find healthy ways to manage stress.  · Get regular exercise. Ask your health care provider to suggest some activities that are safe for you.  SEEK MEDICAL CARE IF:  · You feel your heart skipping beats often (more than once a day).  · Your heart skips beats and you feel dizzy, lightheaded, or very tired.  SEEK IMMEDIATE MEDICAL CARE IF:   · You have chest pain.  · You have trouble breathing.     This information is not intended to replace advice given to you by your health care provider. Make sure you discuss any questions you have with your health care provider.     Document Released: 08/21/2015 Document Revised: 05/03/2016 Document Reviewed: 08/21/2015  [employer] Interactive Patient Education ©2017 [employer]

## 2017-10-27 ENCOUNTER — ANTICOAGULATION VISIT (OUTPATIENT)
Dept: CARDIOLOGY | Facility: CLINIC | Age: 82
End: 2017-10-27

## 2017-10-27 NOTE — PROGRESS NOTES
NO DOSAGE CHANGE AND RECHECK LEVEL IN 3 WEEKS PER CHAKA YBARRA PAC. VERBAL ORDERS READ BACK AND VERIFIED BY LINDSEY NARAYAN. PATIENT AWARE, ABOVE ADDRESSED WITH PROVIDER BY LIZETH MART. PH,LPN

## 2017-10-31 ENCOUNTER — APPOINTMENT (OUTPATIENT)
Dept: CARDIOLOGY | Facility: HOSPITAL | Age: 82
End: 2017-10-31

## 2017-11-17 ENCOUNTER — OFFICE VISIT (OUTPATIENT)
Dept: CARDIOLOGY | Facility: CLINIC | Age: 82
End: 2017-11-17

## 2017-11-17 DIAGNOSIS — I49.5 TACHY-BRADY SYNDROME (HCC): Primary | ICD-10-CM

## 2017-11-17 PROCEDURE — 93288 INTERROG EVL PM/LDLS PM IP: CPT | Performed by: INTERNAL MEDICINE

## 2017-11-20 ENCOUNTER — APPOINTMENT (OUTPATIENT)
Dept: CARDIOLOGY | Facility: HOSPITAL | Age: 82
End: 2017-11-20

## 2017-11-30 ENCOUNTER — HOSPITAL ENCOUNTER (OUTPATIENT)
Dept: CARDIOLOGY | Facility: HOSPITAL | Age: 82
Discharge: HOME | End: 2017-11-30

## 2017-11-30 ENCOUNTER — OUTSIDE FACILITY SERVICE (OUTPATIENT)
Dept: CARDIOLOGY | Facility: CLINIC | Age: 82
End: 2017-11-30

## 2017-11-30 LAB
MAXIMAL PREDICTED HEART RATE: 138 BPM
STRESS TARGET HR: 117 BPM

## 2017-11-30 PROCEDURE — A9500 TC99M SESTAMIBI: HCPCS | Performed by: INTERNAL MEDICINE

## 2017-11-30 PROCEDURE — 25010000002 REGADENOSON 0.4 MG/5ML SOLUTION: Performed by: INTERNAL MEDICINE

## 2017-11-30 PROCEDURE — 93017 CV STRESS TEST TRACING ONLY: CPT

## 2017-11-30 PROCEDURE — 78452 HT MUSCLE IMAGE SPECT MULT: CPT | Performed by: INTERNAL MEDICINE

## 2017-11-30 PROCEDURE — 78452 HT MUSCLE IMAGE SPECT MULT: CPT

## 2017-11-30 PROCEDURE — 93018 CV STRESS TEST I&R ONLY: CPT | Performed by: INTERNAL MEDICINE

## 2017-11-30 PROCEDURE — 0 TECHNETIUM SESTAMIBI: Performed by: INTERNAL MEDICINE

## 2017-11-30 RX ADMIN — TECHNETIUM TC-99M SESTAMIBI 1 DOSE: 1 INJECTION INTRAVENOUS at 14:15

## 2017-11-30 RX ADMIN — REGADENOSON 0.4 MG: 0.08 INJECTION, SOLUTION INTRAVENOUS at 14:15

## 2017-12-05 ENCOUNTER — DOCUMENTATION (OUTPATIENT)
Dept: CARDIOLOGY | Facility: CLINIC | Age: 82
End: 2017-12-05

## 2017-12-05 NOTE — PROGRESS NOTES
STRESS TEST RESULTS BACK IN THE OFFICE TODAY, 2-3 M O. F/U RECOMMENDED.  HE HAS A  FOLLOW UP APPT. HERE AT THE OFFICE BANDAR. PAL KEYES

## 2017-12-06 ENCOUNTER — OFFICE VISIT (OUTPATIENT)
Dept: CARDIOLOGY | Facility: CLINIC | Age: 82
End: 2017-12-06

## 2017-12-06 VITALS
DIASTOLIC BLOOD PRESSURE: 66 MMHG | SYSTOLIC BLOOD PRESSURE: 109 MMHG | HEART RATE: 86 BPM | HEIGHT: 69 IN | WEIGHT: 213 LBS | OXYGEN SATURATION: 92 % | BODY MASS INDEX: 31.55 KG/M2

## 2017-12-06 DIAGNOSIS — Z95.0 PACEMAKER: ICD-10-CM

## 2017-12-06 DIAGNOSIS — I48.0 PAROXYSMAL ATRIAL FIBRILLATION (HCC): Primary | ICD-10-CM

## 2017-12-06 DIAGNOSIS — I25.10 CORONARY ARTERY DISEASE INVOLVING NATIVE CORONARY ARTERY OF NATIVE HEART WITHOUT ANGINA PECTORIS: ICD-10-CM

## 2017-12-06 DIAGNOSIS — I73.9 CLAUDICATION (HCC): ICD-10-CM

## 2017-12-06 PROCEDURE — 99213 OFFICE O/P EST LOW 20 MIN: CPT | Performed by: PHYSICIAN ASSISTANT

## 2017-12-06 PROCEDURE — 93000 ELECTROCARDIOGRAM COMPLETE: CPT | Performed by: PHYSICIAN ASSISTANT

## 2017-12-06 RX ORDER — METHOCARBAMOL 750 MG/1
750 TABLET, FILM COATED ORAL AS NEEDED
COMMUNITY
Start: 2017-11-27

## 2017-12-06 RX ORDER — NITROGLYCERIN 0.4 MG/1
TABLET SUBLINGUAL
Qty: 25 TABLET | Refills: 2 | Status: SHIPPED | OUTPATIENT
Start: 2017-12-06

## 2017-12-06 NOTE — PROGRESS NOTES
Problem list     Subjective   Sonido Dodd is a 82 y.o. male     Chief Complaint   Patient presents with   • Follow-up     patient appears in office today for routine follow up    • Atrial Fibrillation     patient has H/O paroxysmal atrial fibrillation    • Coronary Artery Disease     patient has H/O CAD    • Hypertension     patient has H/O HTN ; states B/P has been fluctuating at random    • Fatigue     patient states he has been more fatigued than normal    • Shortness of Breath     patient states he has SOA on exertion only   • Dizziness     patient states he is having dizzy spells at random ; unsteady at times (ortho's taken)   Problem List:      1. Coronary artery disease   1.1 Stenting to OM X 2 and LAD, 2007.  2. Atrial fibrillation   2.1 CHADS score of at least 2, patient is on anticoagulation therapy with Coumadin.  3. Conduction system disease   3.1 PPM Dual Chamber [facility] Jose F 2/2/16  4. Degenerative disc and joint disease.   5. Hypertension  6. Hyperlipidemia  7. Shortness of breath   7.1 Echo 7/12/17-moderate LVH, EF greater than 65%, diastolic dysfunction 2, aortic VTI 1.2; porcine bioprosthetic aortic valve is present; that was appears to function normally; trace MR  8. Valvular Heart Disease.  8.1 Aortic valve replacement a #21 [facility] Jose F Trifecta valve.  The patient had concurrent VG to diagonal placement.   9.  Carotid artery disease      9.1 Carotid artery ultrasound 7/12/17-16-49% stenosis mild bifurcation bilaterally, less than or equal to 15% stenosis GABRIELA, 16-49% stenosis and LICA, antegrade flow disturbance in both vertebral arteries.    HPI  The patient presents for follow-up today.  The patient did have a stress test after his last appointment here.  His stress test indicated no evidence of ischemia with preserved post stress EF.  The patient also had an echocardiogram this summer which indicated normal aortic valve.  Other findings are as outlined above.  There was concern about palpitations,  etc. with the patient at last visit.  He does have history of A. fib.  Amiodarone was discontinued because of thyroid dysfunction while on that therapy.  We had planned on possibly starting the patient on flecainide to address any dysrhythmic symptoms.  He did have a follow up interrogation in November however which indicated no dysrhythmic activity.  There is no evidence of A. fib.  He had normal pacer function otherwise.  At this time, the patient tells me that his palpitations are minimal, at best.  His biggest concern at this time is with lower extremity weakness.  He feels that this started when atorvastatin was increased from 20-40 mg daily.  We had discussed consideration for holding the atorvastatin for 2 weeks to see if symptoms resolved.  He tells me however that when holding that before, he developed severe dyslipidemia issues within a short amount of time.  Otherwise, the patient has no chest pain.  He has stable dyspnea.  He has no current dysrhythmic activity, as above.  The patient has no further complaints otherwise.    Current Outpatient Prescriptions   Medication Sig Dispense Refill   • aspirin 81 MG tablet Take 81 mg by mouth Daily.     • atorvastatin (LIPITOR) 40 MG tablet Take 40 mg by mouth Daily.     • HYDROcodone-acetaminophen (NORCO)  MG per tablet Take 1 tablet by mouth As Needed for Severe Pain  (pain only). prn     • levothyroxine (SYNTHROID, LEVOTHROID) 50 MCG tablet Take 50 mcg by mouth Daily.     • methocarbamol (ROBAXIN) 750 MG tablet Take 750 mg by mouth As Needed for Muscle Spasms.     • montelukast (SINGULAIR) 10 MG tablet Take 10 mg by mouth Daily.     • Multiple Vitamin (ONE-A-DAY MENS) tablet Take 1 tablet by mouth daily.     • Omega-3 Fatty Acids (FISH OIL EXTRA STRENGTH) 1200 MG capsule Take  by mouth.     • warfarin (COUMADIN) 7.5 MG tablet Take  by mouth. 7.5 mg daily except takes 1/2 tab on Thursday's     • nitroglycerin (NITROSTAT) 0.4 MG SL tablet 1 under the tongue  as needed for angina, may repeat q5mins for up three doses 25 tablet 2     No current facility-administered medications for this visit.        Ondansetron    Past Medical History:   Diagnosis Date   • Cancer     malig. neoplasm of prostate   • Coronary artery disease    • GERD (gastroesophageal reflux disease)    • Hip discomfort     rt. hip s/p falls, spurs present per patient   • Hyperlipidemia    • Hypertension    • Pulmonary hypertension    • Sleep apnea     uses c-pap   • Transient cerebral ischemia        Social History     Social History   • Marital status:      Spouse name: N/A   • Number of children: N/A   • Years of education: N/A     Occupational History   • Not on file.     Social History Main Topics   • Smoking status: Never Smoker   • Smokeless tobacco: Never Used   • Alcohol use Defer      Comment: denied   • Drug use: No   • Sexual activity: Defer     Other Topics Concern   • Not on file     Social History Narrative       Family History   Problem Relation Age of Onset   • Diabetes Mother    • Stroke Mother    • Cancer Father    • Cancer Sister    • Diabetes Brother    • Cancer Brother    • Other Brother      ACUTE MYOCARDIAL INFARCTION       Review of Systems   Constitutional: Positive for fatigue (patient states he has noticed increase in fatigue ).   HENT: Positive for congestion (head congestion ; due to seasonal allergies). Negative for rhinorrhea, sneezing and sore throat.    Eyes: Positive for visual disturbance (wears reading glasses ).   Respiratory: Positive for apnea (SHAUNA with CPAP use) and cough (on exertion only). Negative for chest tightness and wheezing.    Cardiovascular: Negative.  Negative for chest pain (denies CP), palpitations (denies palpitations) and leg swelling.   Gastrointestinal: Negative.  Negative for abdominal distention, abdominal pain, nausea and vomiting.   Endocrine: Negative.  Negative for cold intolerance, heat intolerance, polyphagia and polyuria.  "  Genitourinary: Negative.  Negative for difficulty urinating, frequency and urgency.   Musculoskeletal: Negative.  Negative for arthralgias, back pain, myalgias, neck pain and neck stiffness.   Skin: Negative.  Negative for rash and wound.   Allergic/Immunologic: Positive for environmental allergies (sesaonal allergies).   Neurological: Positive for dizziness (pt states he has been having dizzy spells at random ; makes him unsteady ) and weakness (generalized weakness). Negative for light-headedness and headaches.   Hematological: Bruises/bleeds easily (bruises and bleeds easily).   Psychiatric/Behavioral: Negative for agitation, confusion and sleep disturbance (denies waking up smothering/SOA). The patient is nervous/anxious (occasional anxiousness).        Objective   Vitals:    12/06/17 0952 12/06/17 1006 12/06/17 1007 12/06/17 1008   BP: 114/69 117/74 121/74 109/66   BP Location: Left arm Right arm Right arm Right arm   Patient Position: Sitting Lying Sitting Standing   Pulse: 84 85 85 86   SpO2: 92%      Weight: 96.6 kg (213 lb)      Height: 175.3 cm (69\")         /66 (BP Location: Right arm, Patient Position: Standing)  Pulse 86  Ht 175.3 cm (69\")  Wt 96.6 kg (213 lb)  SpO2 92%  BMI 31.45 kg/m2   Lab Results (most recent)     None        Physical Exam   Constitutional: He is oriented to person, place, and time. He appears well-developed and well-nourished. No distress.   HENT:   Head: Normocephalic and atraumatic.   Eyes: Conjunctivae and EOM are normal. Pupils are equal, round, and reactive to light.   Neck: Normal range of motion. Neck supple. No JVD present. No tracheal deviation present.   Cardiovascular: Normal rate, regular rhythm and intact distal pulses.    Murmur heard.   Systolic (Second RICS and LLSB.) murmur is present with a grade of 1/6   Pulses:       Dorsalis pedis pulses are 1+ on the right side, and 1+ on the left side.        Posterior tibial pulses are 1+ on the right side, and " 1+ on the left side.   Pulmonary/Chest: Effort normal and breath sounds normal.   Abdominal: Soft. Bowel sounds are normal. He exhibits no distension and no mass. There is no tenderness. There is no rebound and no guarding.   Musculoskeletal: Normal range of motion. He exhibits edema. He exhibits no tenderness or deformity.   Neurological: He is alert and oriented to person, place, and time.   Skin: Skin is warm and dry. No rash noted. No erythema. No pallor.   Psychiatric: He has a normal mood and affect. His behavior is normal. Judgment and thought content normal.   Nursing note and vitals reviewed.        Procedure     ECG 12 Lead  Date/Time: 12/6/2017 9:54 AM  Performed by: CHAKA YBARRA  Authorized by: CHAKA YBARRA   Comments: Atrial fib    Atrial pacing with ventricular sensing, no acute changes noted.               Assessment/Plan      Diagnosis Plan   1. Paroxysmal atrial fibrillation  ECG 12 Lead    Duplex Lower Extremity Art / Grafts - Bilateral CAR    Duplex Lower Extremity Art / Grafts - Bilateral CAR   2. Claudication  Duplex Lower Extremity Art / Grafts - Bilateral CAR    Duplex Lower Extremity Art / Grafts - Bilateral CAR   3. Coronary artery disease involving native coronary artery of native heart without angina pectoris     4. Pacemaker       The patient seems stable at this time.  His stress test indicates no ischemia.  Recent echo indicated stable aortic valve replacement.  His interrogation indicates stable pacemaker function, again with no dysrhythmic activity noted.  We had discussed consideration for institution of flecainide because of increasing palpitations at last visit.  With review of interrogation, as above, there is no significant dysrhythmic activity.  I would hold on antidysrhythmic therapy for now.  We will decrease atorvastatin back to 20 mg for now.  He has developed severe weakness of bilateral lower extremity since increasing that.  We may have to consider institution of  that he had some point.  He will be having lipids drawn however and February.  We will recommend further at that time.  For now, nothing further will see him back in 6 months.  I would like to schedule for lower extreme the arterial duplex because of intermittent claudication type symptoms.                 Electronically signed by:

## 2017-12-07 ENCOUNTER — ANTICOAGULATION VISIT (OUTPATIENT)
Dept: CARDIOLOGY | Facility: CLINIC | Age: 82
End: 2017-12-07

## 2017-12-07 LAB — INR PPP: 4.7 (ref 2–3)

## 2017-12-07 NOTE — PROGRESS NOTES
PER CHAKA YBARRA, PAC HOLD TODAY'S DOSE, ONLY TAKE 1/2 (7.5) BANDAR. THEN RESUME PREVIOUS DOSING  AND RECHECK LEVEL IN 2 WEEKS. VERBAL ORDERS READ BACK AND VERIFIED BY LINDSEY NARAYAN. PATIENT AWARE, VERBALIZED OK. PH,LPN

## 2017-12-12 ENCOUNTER — HOSPITAL ENCOUNTER (OUTPATIENT)
Dept: CARDIOLOGY | Facility: HOSPITAL | Age: 82
Discharge: HOME | End: 2017-12-12
Admitting: PHYSICIAN ASSISTANT

## 2017-12-12 PROCEDURE — 93925 LOWER EXTREMITY STUDY: CPT

## 2018-01-15 ENCOUNTER — DOCUMENTATION (OUTPATIENT)
Dept: CARDIOLOGY | Facility: CLINIC | Age: 83
End: 2018-01-15

## 2018-01-15 NOTE — PROGRESS NOTES
TRIED CALLING PATIENT  NUMBER IN CHART TO REMIND THEM INR WAY PAST DUE, BUT NO ANSWER AND NO VOICEMAIL. PH,LPN

## 2018-01-29 ENCOUNTER — TELEPHONE (OUTPATIENT)
Dept: CARDIOLOGY | Facility: CLINIC | Age: 83
End: 2018-01-29

## 2018-01-29 DIAGNOSIS — Z95.2 H/O AORTIC VALVE REPLACEMENT: ICD-10-CM

## 2018-01-29 DIAGNOSIS — I48.91 ATRIAL FIBRILLATION, UNSPECIFIED TYPE (HCC): Primary | ICD-10-CM

## 2018-01-29 NOTE — TELEPHONE ENCOUNTER
----- Message from Rebecca Thakur LPN sent at 1/29/2018 10:47 AM EST -----   Patient is requesting Lab order for PT/INR be faxed to the lab in [place] that he uses. He states that you knew where to send it to.

## 2018-01-29 NOTE — TELEPHONE ENCOUNTER
OK PER CHAKA YBARRA, PAC TO SEND NEW INR STANDING ORDER. NEW ORDER FAXED TO [place], [place]. PHONE [phone]. PAL KEYES

## 2018-01-31 ENCOUNTER — ANTICOAGULATION VISIT (OUTPATIENT)
Dept: CARDIOLOGY | Facility: CLINIC | Age: 83
End: 2018-01-31

## 2018-01-31 LAB
INR PPP: 2.2 (ref 0.8–1.2)
INR PPP: 2.2 (ref 2–3)
PROTHROMBIN TIME: 21.8 SEC (ref 9.1–12)

## 2018-01-31 NOTE — PROGRESS NOTES
NO DOSAGE CHANGE AND RECHECK LEVEL IN 1MO.  PER CHAKA YBARRA, PAC. VERBAL ORDERS READ BACK AND VERIFIED BY CHAKA YBARRA, PAC. PATIENT'S WIFE AWARE VERBALIZED OK. PH,LPN

## 2018-02-28 ENCOUNTER — ANTICOAGULATION VISIT (OUTPATIENT)
Dept: CARDIOLOGY | Facility: CLINIC | Age: 83
End: 2018-02-28

## 2018-02-28 LAB
INR PPP: 2.4 (ref 0.8–1.2)
INR PPP: 2.4 (ref 2–3)
PROTHROMBIN TIME: 24.2 SEC (ref 9.1–12)

## 2018-02-28 NOTE — PROGRESS NOTES
NO DOSAGE CHANGE AND RECHECK LEVEL IN 2-3 WEEKS PER LINDSEY NARAYAN. VERBAL ORDERS READ BACK AND VERIFIED BY LINDSEY NARAYAN. PATIENT'S WIFE AWARE VERBALIZED OK. PH,LPN

## 2018-04-02 DIAGNOSIS — I48.91 ATRIAL FIBRILLATION, UNSPECIFIED TYPE (HCC): Primary | ICD-10-CM

## 2018-04-02 DIAGNOSIS — Z95.2 AORTIC VALVE REPLACED: ICD-10-CM

## 2018-04-02 RX ORDER — WARFARIN SODIUM 7.5 MG/1
TABLET ORAL
Qty: 30 TABLET | Refills: 11 | Status: SHIPPED | OUTPATIENT
Start: 2018-04-02 | End: 2018-05-29 | Stop reason: SDUPTHER

## 2018-04-10 ENCOUNTER — ANTICOAGULATION VISIT (OUTPATIENT)
Dept: CARDIOLOGY | Facility: CLINIC | Age: 83
End: 2018-04-10

## 2018-04-10 LAB
INR PPP: 1.6 (ref 0.8–1.2)
INR PPP: 1.6 (ref 2–3)
PROTHROMBIN TIME: 16.2 SEC (ref 9.1–12)

## 2018-04-10 NOTE — PROGRESS NOTES
PER CHAKA YBARRA, PAC TAKE EXTRA 1/2 TAB (1/2 OF A 7.5 MG TAB) THEN RESUME PREVIOUS DOSING AND RECHECK LEVEL IN 2 WEEKS. VERBAL ORDERS READ BACK AND VERIFIED BY LINDSEY NARAYAN. PATIENT'S WIFE AWARE, VERBALIZED OK. PH,LPN

## 2018-05-16 ENCOUNTER — ANTICOAGULATION VISIT (OUTPATIENT)
Dept: CARDIOLOGY | Facility: CLINIC | Age: 83
End: 2018-05-16

## 2018-05-16 LAB
INR PPP: 1.9 (ref 0.8–1.2)
INR PPP: 1.9 (ref 2–3)
PROTHROMBIN TIME: 19.7 SEC (ref 9.1–12)

## 2018-05-16 NOTE — PROGRESS NOTES
PER CHAKA YBARRA, PAC CHANGE TO 7.5 MG DAILY AND RECHECK LEVEL IN 2-3 WEEKS. VERBAL ORDERS READ BACK AND VERIFIED BY CHAKA YBARRA, PAC. PATIENT AWARE, VERBALIZED OK. PH,LPN

## 2018-05-29 DIAGNOSIS — I48.91 ATRIAL FIBRILLATION, UNSPECIFIED TYPE (HCC): ICD-10-CM

## 2018-05-29 DIAGNOSIS — Z95.2 AORTIC VALVE REPLACED: ICD-10-CM

## 2018-05-29 RX ORDER — WARFARIN SODIUM 7.5 MG/1
TABLET ORAL
Qty: 90 TABLET | Refills: 3 | Status: SHIPPED | OUTPATIENT
Start: 2018-05-29 | End: 2018-06-04 | Stop reason: SDUPTHER

## 2018-06-04 DIAGNOSIS — I48.91 ATRIAL FIBRILLATION, UNSPECIFIED TYPE (HCC): ICD-10-CM

## 2018-06-04 DIAGNOSIS — Z95.2 AORTIC VALVE REPLACED: ICD-10-CM

## 2018-06-04 RX ORDER — WARFARIN SODIUM 7.5 MG/1
TABLET ORAL
Qty: 90 TABLET | Refills: 3 | Status: SHIPPED | OUTPATIENT
Start: 2018-06-04 | End: 2019-06-14 | Stop reason: SDUPTHER

## 2018-06-06 ENCOUNTER — OFFICE VISIT (OUTPATIENT)
Dept: CARDIOLOGY | Facility: CLINIC | Age: 83
End: 2018-06-06

## 2018-06-06 VITALS
HEIGHT: 69 IN | WEIGHT: 206 LBS | BODY MASS INDEX: 30.51 KG/M2 | SYSTOLIC BLOOD PRESSURE: 139 MMHG | HEART RATE: 83 BPM | OXYGEN SATURATION: 95 % | DIASTOLIC BLOOD PRESSURE: 85 MMHG

## 2018-06-06 DIAGNOSIS — R42 DIZZINESS: ICD-10-CM

## 2018-06-06 DIAGNOSIS — R00.2 PALPITATIONS: ICD-10-CM

## 2018-06-06 DIAGNOSIS — I10 ESSENTIAL HYPERTENSION: ICD-10-CM

## 2018-06-06 DIAGNOSIS — R06.02 SHORTNESS OF BREATH: ICD-10-CM

## 2018-06-06 DIAGNOSIS — I48.0 PAROXYSMAL ATRIAL FIBRILLATION (HCC): ICD-10-CM

## 2018-06-06 DIAGNOSIS — I25.10 CORONARY ARTERY DISEASE INVOLVING NATIVE CORONARY ARTERY OF NATIVE HEART WITHOUT ANGINA PECTORIS: Primary | ICD-10-CM

## 2018-06-06 PROCEDURE — 93000 ELECTROCARDIOGRAM COMPLETE: CPT | Performed by: PHYSICIAN ASSISTANT

## 2018-06-06 PROCEDURE — 99214 OFFICE O/P EST MOD 30 MIN: CPT | Performed by: PHYSICIAN ASSISTANT

## 2018-06-06 RX ORDER — PANTOPRAZOLE SODIUM 40 MG/1
TABLET, DELAYED RELEASE ORAL DAILY
COMMUNITY
Start: 2018-06-05 | End: 2018-11-13 | Stop reason: ALTCHOICE

## 2018-06-06 RX ORDER — UBIDECARENONE 100 MG
200 CAPSULE ORAL DAILY
COMMUNITY

## 2018-06-06 NOTE — PROGRESS NOTES
Problem list     Subjective   Sonido Dodd is a 82 y.o. male     Chief Complaint   Patient presents with   • Follow-up     presents for 6 month follow up   • Atrial Fibrillation   Problem List:      1. Coronary artery disease   1.1 Stenting to OM X 2 and LAD, 2007.  2. Atrial fibrillation   2.1 CHADS score of at least 2, patient is on anticoagulation therapy with Coumadin.  3. Conduction system disease   3.1 PPM Dual Chamber St. Jose F 2/2/16  4. Degenerative disc and joint disease.   5. Hypertension  6. Hyperlipidemia  7. Shortness of breath   7.1 Echo 7/12/17-moderate LVH, EF greater than 65%, diastolic dysfunction 2, aortic VTI 1.2; porcine bioprosthetic aortic valve is present; that was appears to function normally; trace MR  8. Valvular Heart Disease.  8.1 Aortic valve replacement a #21 [facility] Jose F Trifecta valve.  The patient had concurrent VG to diagonal placement.   9.  Carotid artery disease      9.1 Carotid artery ultrasound 7/12/17-16-49% stenosis mild bifurcation bilaterally, less than or equal to 15% stenosis GABRIELA, 16-49% stenosis and LICA, antegrade flow disturbance in both vertebral arteries.    HPI  The patient presents in today for follow-up.  Since last appointment, he has remained stable.  The patient denies chest pain.  He has stable dyspnea.  He relates to only rare palpitations.  Blood pressures typically are controlled.  He does relate to hypotensive episodes where he will have weakness and dizziness.  This is mostly nonproblematic by his report.  He denies PND or orthopnea.  He relates to no significant edema.  Exercise capacity is poor but mostly at baseline and without limitation from cardiovascular standpoint.  The patient has no further complaints otherwise.    Current Outpatient Prescriptions   Medication Sig Dispense Refill   • aspirin 81 MG tablet Take 81 mg by mouth Daily.     • atorvastatin (LIPITOR) 40 MG tablet Take 40 mg by mouth Daily.     • coenzyme Q10 100 MG capsule Take 200 mg by  mouth Daily.     • levothyroxine (SYNTHROID, LEVOTHROID) 50 MCG tablet Take 50 mcg by mouth Daily.     • methocarbamol (ROBAXIN) 750 MG tablet Take 750 mg by mouth As Needed for Muscle Spasms.     • montelukast (SINGULAIR) 10 MG tablet Take 10 mg by mouth Daily.     • Multiple Vitamin (ONE-A-DAY MENS) tablet Take 1 tablet by mouth daily.     • nitroglycerin (NITROSTAT) 0.4 MG SL tablet 1 under the tongue as needed for angina, may repeat q5mins for up three doses 25 tablet 2   • Omega-3 Fatty Acids (FISH OIL EXTRA STRENGTH) 1200 MG capsule Take  by mouth.     • pantoprazole (PROTONIX) 40 MG EC tablet Daily.     • warfarin (COUMADIN) 7.5 MG tablet TAKING 7.5 MG DAILY 90 tablet 3     No current facility-administered medications for this visit.        Ondansetron    Past Medical History:   Diagnosis Date   • Cancer     malig. neoplasm of prostate   • Coronary artery disease    • GERD (gastroesophageal reflux disease)    • Hip discomfort     rt. hip s/p falls, spurs present per patient   • Hyperlipidemia    • Hypertension    • Pulmonary hypertension    • Sleep apnea     uses c-pap   • Transient cerebral ischemia        Social History     Social History   • Marital status:      Spouse name: N/A   • Number of children: N/A   • Years of education: N/A     Occupational History   • Not on file.     Social History Main Topics   • Smoking status: Never Smoker   • Smokeless tobacco: Never Used   • Alcohol use Defer      Comment: denied   • Drug use: No   • Sexual activity: Defer     Other Topics Concern   • Not on file     Social History Narrative   • No narrative on file       Family History   Problem Relation Age of Onset   • Diabetes Mother    • Stroke Mother    • Cancer Father    • Cancer Sister    • Diabetes Brother    • Cancer Brother    • Other Brother         ACUTE MYOCARDIAL INFARCTION       Review of Systems   Constitutional: Negative for fatigue.   HENT: Positive for sneezing (sneezing due to pollen/allergies).  "Negative for congestion, rhinorrhea and sore throat.    Eyes: Positive for visual disturbance (wears glasses PRN).   Respiratory: Positive for apnea (SHAUNA with CPAP), cough (dry allergu cough ) and shortness of breath (easily SOA ; worse on exertion ). Negative for chest tightness and wheezing.    Cardiovascular: Positive for palpitations (occasional palpitations). Negative for chest pain (denies CP) and leg swelling.   Gastrointestinal: Negative.  Negative for abdominal distention, abdominal pain, nausea and vomiting.   Endocrine: Negative.  Negative for cold intolerance, heat intolerance, polyphagia and polyuria.   Genitourinary: Negative.  Negative for difficulty urinating and frequency.   Musculoskeletal: Positive for arthralgias (joints). Negative for back pain, neck pain and neck stiffness.   Skin: Negative.  Negative for rash and wound.   Allergic/Immunologic: Positive for environmental allergies (seasonal allergies). Negative for food allergies.   Neurological: Positive for weakness (generalized weakness). Negative for dizziness (occasional dizziness), light-headedness and headaches.   Hematological: Bruises/bleeds easily (bruises and bleeds eaisly).   Psychiatric/Behavioral: Positive for agitation (easily agitated) and confusion (easily confused). Negative for sleep disturbance (denies waking up smothering/SOA). The patient is not nervous/anxious.        Objective   Vitals:    06/06/18 0939   BP: 139/85   BP Location: Left arm   Patient Position: Sitting   Pulse: 83   SpO2: 95%   Weight: 93.4 kg (206 lb)   Height: 175.3 cm (69\")      /85 (BP Location: Left arm, Patient Position: Sitting)   Pulse 83   Ht 175.3 cm (69\")   Wt 93.4 kg (206 lb)   SpO2 95%   BMI 30.42 kg/m²    Lab Results (most recent)     None        Physical Exam   Constitutional: He is oriented to person, place, and time. He appears well-developed and well-nourished. No distress.   HENT:   Head: Normocephalic and atraumatic.   Eyes: " Conjunctivae and EOM are normal. Pupils are equal, round, and reactive to light.   Neck: Normal range of motion. Neck supple. No JVD present. No tracheal deviation present.   Cardiovascular: Normal rate, regular rhythm and intact distal pulses.    Murmur heard.   Systolic (Second RICS and LLSB.) murmur is present with a grade of 1/6   Pulses:       Dorsalis pedis pulses are 1+ on the right side, and 1+ on the left side.        Posterior tibial pulses are 1+ on the right side, and 1+ on the left side.   Pulmonary/Chest: Effort normal and breath sounds normal.   Abdominal: Soft. Bowel sounds are normal. He exhibits no distension and no mass. There is no tenderness. There is no rebound and no guarding.   Musculoskeletal: Normal range of motion. He exhibits edema. He exhibits no tenderness or deformity.   Neurological: He is alert and oriented to person, place, and time.   Skin: Skin is warm and dry. No rash noted. No erythema. No pallor.   Psychiatric: He has a normal mood and affect. His behavior is normal. Judgment and thought content normal.   Nursing note and vitals reviewed.        Procedure   Procedures       Assessment/Plan      Diagnosis Plan   1. Coronary artery disease involving native coronary artery of native heart without angina pectoris     2. Paroxysmal atrial fibrillation     3. Essential hypertension     4. Palpitations     5. Shortness of breath     6. Dizziness         The patient is stable for the most part.  We did review previous echo from last year.  That indicated preserved systolic function was stable aortic valve replacement.  He had no significant abnormalities otherwise.  His previous carotid duplex and lower extremity arterial duplex suggested nonobstructive disease.  The patient is on appropriate medications for now which I will make no changes.  We will see the patient back in 6 months, sooner if indicated by course.  He will continue routine pacer interrogations through the clinic.          Patient's Body mass index is 30.42 kg/m². BMI is above normal parameters. Recommendations include: educational material and referral to primary care.             Electronically signed by:

## 2018-06-06 NOTE — PATIENT INSTRUCTIONS

## 2018-06-15 ENCOUNTER — OFFICE VISIT (OUTPATIENT)
Dept: CARDIOLOGY | Facility: CLINIC | Age: 83
End: 2018-06-15

## 2018-06-15 DIAGNOSIS — Z95.2 AORTIC VALVE REPLACED: ICD-10-CM

## 2018-06-15 DIAGNOSIS — I49.5 TACHY-BRADY SYNDROME (HCC): Primary | ICD-10-CM

## 2018-06-15 DIAGNOSIS — I48.91 ATRIAL FIBRILLATION, UNSPECIFIED TYPE (HCC): Primary | ICD-10-CM

## 2018-06-15 PROCEDURE — 93280 PM DEVICE PROGR EVAL DUAL: CPT | Performed by: INTERNAL MEDICINE

## 2018-06-18 ENCOUNTER — ANTICOAGULATION VISIT (OUTPATIENT)
Dept: CARDIOLOGY | Facility: CLINIC | Age: 83
End: 2018-06-18

## 2018-06-18 LAB
INR PPP: 1.8 (ref 2–3)
INR PPP: 1.8 (ref 2–3)

## 2018-06-18 NOTE — PROGRESS NOTES
PER CHAKA YBARRA, PAC TAKE EXTRA  1/2 TAB  TODAY THEN RESUME PREVIOUS DOSING AND RECHECK LEVEL IN 2 WEEKS. VERBAL ORDERS READ BACK AND VERIFIED BY CHAKA YBARRA, PAC. PATIENT'S WIFE AWARE, VERBALIZED OK. PH,LPN

## 2018-08-07 ENCOUNTER — TELEPHONE (OUTPATIENT)
Dept: CARDIOLOGY | Facility: CLINIC | Age: 83
End: 2018-08-07

## 2018-08-07 NOTE — TELEPHONE ENCOUNTER
----- Message from Deepika Chaudhary MA sent at 8/7/2018 12:38 PM EDT -----  [facility] called needing a refill on amiodarone 200mg daily for the patient. Call back for pharmacy is [phone]

## 2018-08-07 NOTE — TELEPHONE ENCOUNTER
----- Message from Deepika Chaudhary MA sent at 8/7/2018  1:09 PM EDT -----  I called [facility] pharmacy to verify the patient's birthday. Pharmacy called needing a refill on amiodarone. No record of the patient taking amiodarone, it was last in his chart on 10/24/2017 note. He seen nicholas orta. Will you ask to see if the patient needs to be onit?

## 2018-08-07 NOTE — TELEPHONE ENCOUNTER
I called patient , he stated he was having increased episodes of palpitations so he restarted Amiodarone on his own @ 200 mg per day. Has has now been taking this daily x2 weeks.   I called and notified Huy Seals PA-C, he stated it was okay to RF this medication. I also noticed patient has not had his PT/INR re drawn since 06/2018. Patient was notified to have PT/INR redrawn ASAP.   Patient verbalized understanding and had no further questions at this time. -;CARLOS

## 2018-08-09 ENCOUNTER — ANTICOAGULATION VISIT (OUTPATIENT)
Dept: CARDIOLOGY | Facility: CLINIC | Age: 83
End: 2018-08-09

## 2018-08-09 LAB — INR PPP: 3.6 (ref 2–3)

## 2018-08-23 ENCOUNTER — ANTICOAGULATION VISIT (OUTPATIENT)
Dept: CARDIOLOGY | Facility: CLINIC | Age: 83
End: 2018-08-23

## 2018-08-23 LAB — INR PPP: 2.7 (ref 2–3)

## 2018-08-23 NOTE — PROGRESS NOTES
NO DOSAGE CHANGE AND RECHECK LEVEL IN 2-3 WEEKS PER CHAKA YBARRA, PAC. VERBAL ORDERS READ BACK AND VERIFIED BY CHAKA YBARRA PAC. PATIENT AWARE VERBALIZED OK. PH,LPN

## 2018-09-21 ENCOUNTER — ANTICOAGULATION VISIT (OUTPATIENT)
Dept: CARDIOLOGY | Facility: CLINIC | Age: 83
End: 2018-09-21

## 2018-09-21 LAB — INR PPP: 2.9 (ref 2–3)

## 2018-09-21 NOTE — PROGRESS NOTES
NO DOSAGE CHANGE AND RECHECK LEVEL IN 3 WEEKS PER CHALINO CABELLO NP. VERBAL ORDERS READ BACK AND VERIFIED BY CHALINO CABELLO NP. PATIENT'S WIFE AWARE VERBALIZED OK. PH,LPN

## 2018-09-28 ENCOUNTER — OFFICE VISIT (OUTPATIENT)
Dept: CARDIOLOGY | Facility: CLINIC | Age: 83
End: 2018-09-28

## 2018-09-28 VITALS — OXYGEN SATURATION: 93 % | WEIGHT: 212.6 LBS | BODY MASS INDEX: 31.49 KG/M2 | HEIGHT: 69 IN

## 2018-09-28 DIAGNOSIS — I48.0 PAROXYSMAL ATRIAL FIBRILLATION (HCC): ICD-10-CM

## 2018-09-28 DIAGNOSIS — R42 DIZZINESS: ICD-10-CM

## 2018-09-28 DIAGNOSIS — I25.119 CORONARY ARTERY DISEASE INVOLVING NATIVE CORONARY ARTERY OF NATIVE HEART WITH ANGINA PECTORIS (HCC): Primary | ICD-10-CM

## 2018-09-28 DIAGNOSIS — Z95.2 S/P AVR (AORTIC VALVE REPLACEMENT): ICD-10-CM

## 2018-09-28 DIAGNOSIS — R06.02 SHORTNESS OF BREATH: ICD-10-CM

## 2018-09-28 PROCEDURE — 99214 OFFICE O/P EST MOD 30 MIN: CPT | Performed by: PHYSICIAN ASSISTANT

## 2018-09-28 RX ORDER — RANITIDINE HCL 75 MG
75 TABLET ORAL 2 TIMES DAILY
COMMUNITY

## 2018-09-28 RX ORDER — BENZONATATE 100 MG/1
100 CAPSULE ORAL 3 TIMES DAILY PRN
Qty: 30 CAPSULE | Refills: 2 | Status: SHIPPED | OUTPATIENT
Start: 2018-09-28 | End: 2018-10-01 | Stop reason: SDUPTHER

## 2018-09-28 NOTE — PROGRESS NOTES
Subjective   Sonido Dodd is a 82 y.o. male     Chief Complaint   Patient presents with   • Follow-up     Pt is in for follow up appointment- following up on dizziness.    • Coronary Artery Disease   • Atrial Fibrillation   Problem List:      1. Coronary artery disease   1.1 Stenting to OM X 2 and LAD, 2007.  2. Atrial fibrillation   2.1 CHADS score of at least 2, patient is on anticoagulation therapy with Coumadin.  3. Conduction system disease   3.1 PPM Dual Chamber [facility] 2/2/16  4. Degenerative disc and joint disease.   5. Hypertension  6. Hyperlipidemia  7. Shortness of breath   7.1 Echo 7/12/17-moderate LVH, EF greater than 65%, diastolic dysfunction 2, aortic VTI 1.2; porcine bioprosthetic aortic valve is present; that was appears to function normally; trace MR  8. Valvular Heart Disease.  8.1 Aortic valve replacement a #21 [facility] F Trifecta valve.  The patient had concurrent VG to diagonal placement.   9.  Carotid artery disease      9.1 Carotid artery ultrasound 7/12/17-16-49% stenosis mild bifurcation bilaterally, less than or equal to 15% stenosis GABRIELA, 16-49% stenosis and LICA, antegrade flow disturbance in both vertebral arteries.       HPI  The patient presents back today at his request.  He presents in the setting of dizziness predominately.  He tells me that at the beginning of summer, he started noticing episodes of dizziness.  He has chronic orthostatic issues which have been very minimal in the past.  His current dizziness is clearly different than that those described episodes by his report.  His dizziness can occur while standing still.  He will have, by description, a component of the motion consistent at times with vertigo.  He has no associated nausea at that time.  He relates to no rhythm disturbance issues at that time either.  It is noted that his interrogation from June indicated no significant dysrhythmic activity.  He was having these dizzy episodes at that time.  He will have  dizziness while playing golf, typically after exerting for some time.  He has chronic but stable chest discomfort.  He reports mostly a precordial chest aching type sensation which Occurs with increased levels of exertion.  He will have dyspnea at that time as well.  Otherwise, the patient has no failure issues.  He reports no significant dysrhythmic activity.  He has no further complaints otherwise.    Current Outpatient Prescriptions   Medication Sig Dispense Refill   • aspirin 81 MG tablet Take 81 mg by mouth Daily.     • atorvastatin (LIPITOR) 40 MG tablet Take 40 mg by mouth Daily.     • coenzyme Q10 100 MG capsule Take 200 mg by mouth Daily.     • levothyroxine (SYNTHROID, LEVOTHROID) 50 MCG tablet Take 50 mcg by mouth Daily.     • methocarbamol (ROBAXIN) 750 MG tablet Take 750 mg by mouth As Needed for Muscle Spasms.     • montelukast (SINGULAIR) 10 MG tablet Take 10 mg by mouth Daily.     • Multiple Vitamin (ONE-A-DAY MENS) tablet Take 1 tablet by mouth daily.     • nitroglycerin (NITROSTAT) 0.4 MG SL tablet 1 under the tongue as needed for angina, may repeat q5mins for up three doses 25 tablet 2   • Omega-3 Fatty Acids (FISH OIL EXTRA STRENGTH) 1200 MG capsule Take  by mouth.     • pantoprazole (PROTONIX) 40 MG EC tablet Daily.     • raNITIdine (ZANTAC) 75 MG tablet Take 75 mg by mouth 2 (Two) Times a Day.     • warfarin (COUMADIN) 7.5 MG tablet TAKING 7.5 MG DAILY 90 tablet 3   • benzonatate (TESSALON PERLES) 100 MG capsule Take 1 capsule by mouth 3 (Three) Times a Day As Needed for Cough. 30 capsule 2     No current facility-administered medications for this visit.        ALLERGIES    Ondansetron    Past Medical History:   Diagnosis Date   • Cancer (CMS/HCC)     malig. neoplasm of prostate   • Coronary artery disease    • GERD (gastroesophageal reflux disease)    • Hip discomfort     rt. hip s/p falls, spurs present per patient   • Hyperlipidemia    • Hypertension    • Pulmonary hypertension    • Sleep  "apnea     uses c-pap   • Transient cerebral ischemia        Social History     Social History   • Marital status:      Spouse name: N/A   • Number of children: N/A   • Years of education: N/A     Occupational History   • Not on file.     Social History Main Topics   • Smoking status: Never Smoker   • Smokeless tobacco: Never Used   • Alcohol use Defer      Comment: denied   • Drug use: No   • Sexual activity: Defer     Other Topics Concern   • Not on file     Social History Narrative   • No narrative on file       Family History   Problem Relation Age of Onset   • Diabetes Mother    • Stroke Mother    • Cancer Father    • Cancer Sister    • Diabetes Brother    • Cancer Brother    • Other Brother         ACUTE MYOCARDIAL INFARCTION       Review of Systems   Constitutional: Positive for fatigue (Pt states that he doesn't have much energy and he gets tired very quickly).   HENT: Negative.  Negative for congestion, rhinorrhea, sneezing and sore throat.    Eyes: Positive for visual disturbance (Glasses daily).   Respiratory: Positive for apnea (CPAP), cough (Pt c/o having a cough all summer. Productive-clear. ) and shortness of breath (Pt states that he's SoB \"pretty much all the time\" ). Negative for chest tightness.    Cardiovascular: Positive for leg swelling (Pt thinks that he has some bilateral swelling. states \"they're real tight.\" ). Negative for chest pain and palpitations.   Gastrointestinal: Negative.  Negative for abdominal pain, nausea and vomiting.   Endocrine: Negative.  Negative for cold intolerance and heat intolerance.   Genitourinary: Negative.  Negative for difficulty urinating, frequency, hematuria and urgency.   Musculoskeletal: Positive for arthralgias (Pt c/o arthritis in fingers and back), back pain and gait problem (Pt states that he has some issues w/ balance/gait).   Skin: Negative.  Negative for rash and wound.   Allergic/Immunologic: Negative.  Negative for environmental allergies and " "food allergies.   Neurological: Positive for dizziness (Pt states that his dizziness is mostly unchanged since last appt. States it's still an issue) and light-headedness. Negative for syncope, weakness and headaches.   Hematological: Bruises/bleeds easily.   Psychiatric/Behavioral: Negative for agitation and confusion. The patient is nervous/anxious (Pt states \"slightly\" ).        Objective   Ht 175.3 cm (69\")   Wt 96.4 kg (212 lb 9.6 oz)   SpO2 93%   BMI 31.40 kg/m²   Vitals:    09/28/18 0845   SpO2: 93%   Weight: 96.4 kg (212 lb 9.6 oz)   Height: 175.3 cm (69\")      Lab Results (most recent)     None        Physical Exam   Constitutional: He is oriented to person, place, and time. He appears well-developed and well-nourished. No distress.   HENT:   Head: Normocephalic and atraumatic.   Eyes: Pupils are equal, round, and reactive to light. Conjunctivae and EOM are normal.   Neck: Normal range of motion. Neck supple. No JVD present. No tracheal deviation present.   Cardiovascular: Normal rate, regular rhythm and intact distal pulses.    Murmur heard.   Systolic (Second RICS and LLSB.) murmur is present with a grade of 1/6   Pulses:       Dorsalis pedis pulses are 1+ on the right side, and 1+ on the left side.        Posterior tibial pulses are 1+ on the right side, and 1+ on the left side.   Pulmonary/Chest: Effort normal and breath sounds normal.   Abdominal: Soft. Bowel sounds are normal. He exhibits no distension and no mass. There is no tenderness. There is no rebound and no guarding.   Musculoskeletal: Normal range of motion. He exhibits edema. He exhibits no tenderness or deformity.   Neurological: He is alert and oriented to person, place, and time.   Skin: Skin is warm and dry. No rash noted. No erythema. No pallor.   Psychiatric: He has a normal mood and affect. His behavior is normal. Judgment and thought content normal.   Nursing note and vitals reviewed.      Procedure   Procedures   "       Assessment/Plan      Diagnosis Plan   1. Coronary artery disease involving native coronary artery of native heart with angina pectoris (CMS/HCC)  Adult Transthoracic Echo Complete W/ Cont if Necessary Per Protocol    Duplex Carotid Ultrasound CAR    Stress Test With Myocardial Perfusion One Day   2. S/P AVR (aortic valve replacement)  Adult Transthoracic Echo Complete W/ Cont if Necessary Per Protocol    Duplex Carotid Ultrasound CAR    Stress Test With Myocardial Perfusion One Day   3. Paroxysmal atrial fibrillation (CMS/HCC)  Adult Transthoracic Echo Complete W/ Cont if Necessary Per Protocol    Duplex Carotid Ultrasound CAR    Stress Test With Myocardial Perfusion One Day   4. Shortness of breath  Adult Transthoracic Echo Complete W/ Cont if Necessary Per Protocol    Duplex Carotid Ultrasound CAR    Stress Test With Myocardial Perfusion One Day   5. Dizziness  Adult Transthoracic Echo Complete W/ Cont if Necessary Per Protocol    Duplex Carotid Ultrasound CAR    Stress Test With Myocardial Perfusion One Day    Given patient's current symptoms, particularly with his level dizziness, I would like to schedule for a device interrogation to ensure normal function and no significant dysrhythmic activity which may have changed since his interrogation in June, which was very much unremarkable.  I would also like for him to have ischemia assessment and will schedule for chemical nuclear stress test.  I would like to repeat an echo to evaluate aortic valve, systolic function and parameters otherwise.  I will also repeat a carotid duplex given nonobstructive disease by last evaluation.  We did do an orthostatic blood pressure check today which was very much unremarkable.  I will make no changes for now.  I would like to see him back after all the above and we can recommend further at that time.  He will call for any issues prior to follow-up.    No Follow-up on file.           Patient's Body mass index is 31.4 kg/m².  BMI is within normal parameters. No follow-up required.      Electronically signed by:

## 2018-10-01 DIAGNOSIS — R05.9 COUGH: Primary | ICD-10-CM

## 2018-10-01 RX ORDER — BENZONATATE 100 MG/1
100 CAPSULE ORAL 3 TIMES DAILY PRN
Qty: 30 CAPSULE | Refills: 2 | Status: SHIPPED | OUTPATIENT
Start: 2018-10-01 | End: 2019-07-10

## 2018-10-17 ENCOUNTER — ANTICOAGULATION VISIT (OUTPATIENT)
Dept: CARDIOLOGY | Facility: CLINIC | Age: 83
End: 2018-10-17

## 2018-10-17 LAB — INR PPP: 2.9 (ref 2–3)

## 2018-10-17 NOTE — PROGRESS NOTES
NO DOSAGE CHANGE AND RECHECK LEVEL IN 1 MO.PER CHAKA YBARRA, PAC. VERBAL ORDERS READ BACK AND VERIFIED BY CHAKA YBARRA, PAC. PATIENT'S WIFE AWARE VERBALIZED OK. PH,LPN

## 2018-11-06 ENCOUNTER — HOSPITAL ENCOUNTER (OUTPATIENT)
Dept: CARDIOLOGY | Facility: HOSPITAL | Age: 83
Discharge: HOME | End: 2018-11-06

## 2018-11-06 DIAGNOSIS — I48.0 PAROXYSMAL ATRIAL FIBRILLATION (HCC): ICD-10-CM

## 2018-11-06 DIAGNOSIS — R06.02 SHORTNESS OF BREATH: ICD-10-CM

## 2018-11-06 DIAGNOSIS — R42 DIZZINESS: ICD-10-CM

## 2018-11-06 DIAGNOSIS — I25.119 CORONARY ARTERY DISEASE INVOLVING NATIVE CORONARY ARTERY OF NATIVE HEART WITH ANGINA PECTORIS (HCC): ICD-10-CM

## 2018-11-06 DIAGNOSIS — Z95.2 S/P AVR (AORTIC VALVE REPLACEMENT): ICD-10-CM

## 2018-11-06 PROCEDURE — 93018 CV STRESS TEST I&R ONLY: CPT | Performed by: INTERNAL MEDICINE

## 2018-11-06 PROCEDURE — 93306 TTE W/DOPPLER COMPLETE: CPT

## 2018-11-06 PROCEDURE — 78452 HT MUSCLE IMAGE SPECT MULT: CPT

## 2018-11-06 PROCEDURE — 78452 HT MUSCLE IMAGE SPECT MULT: CPT | Performed by: INTERNAL MEDICINE

## 2018-11-06 PROCEDURE — 25010000002 REGADENOSON 0.4 MG/5ML SOLUTION: Performed by: INTERNAL MEDICINE

## 2018-11-06 PROCEDURE — 93306 TTE W/DOPPLER COMPLETE: CPT | Performed by: INTERNAL MEDICINE

## 2018-11-06 PROCEDURE — 93880 EXTRACRANIAL BILAT STUDY: CPT

## 2018-11-06 PROCEDURE — 93017 CV STRESS TEST TRACING ONLY: CPT

## 2018-11-06 PROCEDURE — 0 TECHNETIUM SESTAMIBI: Performed by: INTERNAL MEDICINE

## 2018-11-06 PROCEDURE — A9500 TC99M SESTAMIBI: HCPCS | Performed by: INTERNAL MEDICINE

## 2018-11-06 PROCEDURE — 93880 EXTRACRANIAL BILAT STUDY: CPT | Performed by: INTERNAL MEDICINE

## 2018-11-06 RX ADMIN — TECHNETIUM TC 99M SESTAMIBI 1 DOSE: 1 INJECTION INTRAVENOUS at 14:14

## 2018-11-06 RX ADMIN — TECHNETIUM TC 99M SESTAMIBI 1 DOSE: 1 INJECTION INTRAVENOUS at 14:13

## 2018-11-06 RX ADMIN — REGADENOSON 0.4 MG: 0.08 INJECTION, SOLUTION INTRAVENOUS at 14:13

## 2018-11-07 ENCOUNTER — TELEPHONE (OUTPATIENT)
Dept: CARDIOLOGY | Facility: CLINIC | Age: 83
End: 2018-11-07

## 2018-11-07 LAB
BH CV ECHO MEAS - ACS: 1.4 CM
BH CV ECHO MEAS - AO MEAN PG (FULL): 6.8 MMHG
BH CV ECHO MEAS - AO MEAN PG: 8.2 MMHG
BH CV ECHO MEAS - AO ROOT AREA (BSA CORRECTED): 1.3
BH CV ECHO MEAS - AO ROOT AREA: 5.8 CM^2
BH CV ECHO MEAS - AO ROOT DIAM: 2.7 CM
BH CV ECHO MEAS - AO V2 MEAN: 135.2 CM/SEC
BH CV ECHO MEAS - AO V2 VTI: 31.8 CM
BH CV ECHO MEAS - AVA(I,A): 1.4 CM^2
BH CV ECHO MEAS - AVA(I,D): 1.4 CM^2
BH CV ECHO MEAS - BSA(HAYCOCK): 2.2 M^2
BH CV ECHO MEAS - BSA(HAYCOCK): 2.2 M^2
BH CV ECHO MEAS - BSA: 2.1 M^2
BH CV ECHO MEAS - BSA: 2.1 M^2
BH CV ECHO MEAS - BZI_BMI: 31.3 KILOGRAMS/M^2
BH CV ECHO MEAS - BZI_BMI: 31.3 KILOGRAMS/M^2
BH CV ECHO MEAS - BZI_METRIC_HEIGHT: 175.3 CM
BH CV ECHO MEAS - BZI_METRIC_HEIGHT: 175.3 CM
BH CV ECHO MEAS - BZI_METRIC_WEIGHT: 96.2 KG
BH CV ECHO MEAS - BZI_METRIC_WEIGHT: 96.2 KG
BH CV ECHO MEAS - EDV(CUBED): 18.5 ML
BH CV ECHO MEAS - EDV(MOD-SP4): 73 ML
BH CV ECHO MEAS - EDV(TEICH): 25.6 ML
BH CV ECHO MEAS - EF(CUBED): 68.4 %
BH CV ECHO MEAS - EF(MOD-SP4): 61.6 %
BH CV ECHO MEAS - EF(TEICH): 62.1 %
BH CV ECHO MEAS - ESV(CUBED): 5.8 ML
BH CV ECHO MEAS - ESV(MOD-SP4): 28 ML
BH CV ECHO MEAS - ESV(TEICH): 9.7 ML
BH CV ECHO MEAS - FS: 31.9 %
BH CV ECHO MEAS - IVS/LVPW: 1
BH CV ECHO MEAS - IVSD: 1.5 CM
BH CV ECHO MEAS - LA DIMENSION: 3.4 CM
BH CV ECHO MEAS - LA/AO: 1.2
BH CV ECHO MEAS - LV DIASTOLIC VOL/BSA (35-75): 34.5 ML/M^2
BH CV ECHO MEAS - LV IVRT: 0.11 SEC
BH CV ECHO MEAS - LV MASS(C)D: 128.2 GRAMS
BH CV ECHO MEAS - LV MASS(C)DI: 60.5 GRAMS/M^2
BH CV ECHO MEAS - LV MEAN PG: 1.5 MMHG
BH CV ECHO MEAS - LV SYSTOLIC VOL/BSA (12-30): 13.2 ML/M^2
BH CV ECHO MEAS - LV V1 MEAN: 57.3 CM/SEC
BH CV ECHO MEAS - LV V1 VTI: 14.4 CM
BH CV ECHO MEAS - LVIDD: 2.6 CM
BH CV ECHO MEAS - LVIDS: 1.8 CM
BH CV ECHO MEAS - LVLD AP4: 6.7 CM
BH CV ECHO MEAS - LVLS AP4: 4.7 CM
BH CV ECHO MEAS - LVOT AREA (M): 3.1 CM^2
BH CV ECHO MEAS - LVOT AREA: 3.1 CM^2
BH CV ECHO MEAS - LVOT DIAM: 2 CM
BH CV ECHO MEAS - LVPWD: 1.5 CM
BH CV ECHO MEAS - MV A MAX VEL: 91.3 CM/SEC
BH CV ECHO MEAS - MV DEC SLOPE: 339.4 CM/SEC^2
BH CV ECHO MEAS - MV E MAX VEL: 80 CM/SEC
BH CV ECHO MEAS - MV E/A: 0.88
BH CV ECHO MEAS - RVDD: 2.1 CM
BH CV ECHO MEAS - SI(AO): 87.4 ML/M^2
BH CV ECHO MEAS - SI(CUBED): 6 ML/M^2
BH CV ECHO MEAS - SI(LVOT): 21.2 ML/M^2
BH CV ECHO MEAS - SI(MOD-SP4): 21.3 ML/M^2
BH CV ECHO MEAS - SI(TEICH): 7.5 ML/M^2
BH CV ECHO MEAS - SV(AO): 185.1 ML
BH CV ECHO MEAS - SV(CUBED): 12.6 ML
BH CV ECHO MEAS - SV(LVOT): 44.9 ML
BH CV ECHO MEAS - SV(MOD-SP4): 45 ML
BH CV ECHO MEAS - SV(TEICH): 15.9 ML
BH CV NUCLEAR PRIOR STUDY: 3
BH CV STRESS COMMENTS STAGE 1: NORMAL
BH CV STRESS DOSE REGADENOSON STAGE 1: 0.4
BH CV STRESS DURATION MIN STAGE 1: 0
BH CV STRESS DURATION SEC STAGE 1: 10
BH CV STRESS PROTOCOL 1: NORMAL
BH CV STRESS RECOVERY BP: NORMAL MMHG
BH CV STRESS RECOVERY HR: 86 BPM
BH CV STRESS STAGE 1: 1
BH CV XLRA MEAS LEFT DIST CCA EDV: 25 CM/SEC
BH CV XLRA MEAS LEFT DIST CCA PSV: 97 CM/SEC
BH CV XLRA MEAS LEFT DIST ICA EDV: 46 CM/SEC
BH CV XLRA MEAS LEFT DIST ICA PSV: 143 CM/SEC
BH CV XLRA MEAS LEFT ICA/CCA RATIO: 1.5
BH CV XLRA MEAS LEFT MID ICA EDV: 39 CM/SEC
BH CV XLRA MEAS LEFT MID ICA PSV: 122 CM/SEC
BH CV XLRA MEAS LEFT PROX CCA EDV: 28 CM/SEC
BH CV XLRA MEAS LEFT PROX CCA PSV: 104 CM/SEC
BH CV XLRA MEAS LEFT PROX ECA EDV: 14 CM/SEC
BH CV XLRA MEAS LEFT PROX ECA PSV: 123 CM/SEC
BH CV XLRA MEAS LEFT PROX ICA EDV: 43 CM/SEC
BH CV XLRA MEAS LEFT PROX ICA PSV: 146 CM/SEC
BH CV XLRA MEAS LEFT VERTEBRAL A EDV: 11 CM/SEC
BH CV XLRA MEAS LEFT VERTEBRAL A PSV: 52 CM/SEC
BH CV XLRA MEAS RIGHT DIST CCA EDV: 58 CM/SEC
BH CV XLRA MEAS RIGHT DIST CCA PSV: 86 CM/SEC
BH CV XLRA MEAS RIGHT DIST ICA EDV: 52 CM/SEC
BH CV XLRA MEAS RIGHT DIST ICA PSV: 148 CM/SEC
BH CV XLRA MEAS RIGHT ICA/CCA RATIO: 1.7
BH CV XLRA MEAS RIGHT MID ICA EDV: 50 CM/SEC
BH CV XLRA MEAS RIGHT MID ICA PSV: 127 CM/SEC
BH CV XLRA MEAS RIGHT PROX CCA EDV: 25 CM/SEC
BH CV XLRA MEAS RIGHT PROX CCA PSV: 95 CM/SEC
BH CV XLRA MEAS RIGHT PROX ECA EDV: 17 CM/SEC
BH CV XLRA MEAS RIGHT PROX ECA PSV: 119 CM/SEC
BH CV XLRA MEAS RIGHT PROX ICA EDV: 34 CM/SEC
BH CV XLRA MEAS RIGHT PROX ICA PSV: 112 CM/SEC
BH CV XLRA MEAS RIGHT VERTEBRAL A EDV: 10 CM/SEC
BH CV XLRA MEAS RIGHT VERTEBRAL A PSV: 41 CM/SEC
MAXIMAL PREDICTED HEART RATE: 138 BPM
MAXIMAL PREDICTED HEART RATE: 138 BPM
PERCENT MAX PREDICTED HR: 63.04 %
STRESS BASELINE BP: NORMAL MMHG
STRESS BASELINE HR: 87 BPM
STRESS PERCENT HR: 74 %
STRESS POST PEAK BP: NORMAL MMHG
STRESS POST PEAK HR: 87 BPM
STRESS TARGET HR: 117 BPM
STRESS TARGET HR: 117 BPM

## 2018-11-07 NOTE — TELEPHONE ENCOUNTER
PATIENT AWARE OF STRESS, ECHO, AND CAROTID U/S RESULTS, AND TO KEEP F/U APPT. HERE ON 11-13-18. VERBALIZED HE WOULD BE HERE. PAL KEYES        ----- Message from VALARIE Lora sent at 11/7/2018  3:46 PM EST -----  Keep f/u scheduled 11-13-18.

## 2018-11-07 NOTE — TELEPHONE ENCOUNTER
PATIENT AWARE OF STRESS, ECHO, AND CAROTID U/S RESULTS, AND TO KEEP F/U APPT. HERE ON 11-13-18. VERBALIZED HE WOULD BE HERE. PAL KEYES        ----- Message from VALARIE Lora sent at 11/7/2018  3:43 PM EST -----  Routine f/u.

## 2018-11-07 NOTE — TELEPHONE ENCOUNTER
PATIENT AWARE OF STRESS, ECHO, AND CAROTID U/S RESULTS, AND TO KEEP F/U APPT. HERE ON 11-13-18. VERBALIZED HE WOULD BE HERE. PAL KEYES        ----- Message from VALARIE Lora sent at 11/7/2018  3:44 PM EST -----  Routine f/u.

## 2018-11-13 ENCOUNTER — OFFICE VISIT (OUTPATIENT)
Dept: CARDIOLOGY | Facility: CLINIC | Age: 83
End: 2018-11-13

## 2018-11-13 VITALS
SYSTOLIC BLOOD PRESSURE: 135 MMHG | OXYGEN SATURATION: 92 % | DIASTOLIC BLOOD PRESSURE: 83 MMHG | HEART RATE: 88 BPM | BODY MASS INDEX: 31.28 KG/M2 | HEIGHT: 69 IN | WEIGHT: 211.2 LBS

## 2018-11-13 DIAGNOSIS — I48.91 ATRIAL FIBRILLATION, UNSPECIFIED TYPE (HCC): ICD-10-CM

## 2018-11-13 DIAGNOSIS — I25.118 CORONARY ARTERY DISEASE OF NATIVE ARTERY OF NATIVE HEART WITH STABLE ANGINA PECTORIS (HCC): Primary | ICD-10-CM

## 2018-11-13 DIAGNOSIS — R06.02 SHORTNESS OF BREATH: ICD-10-CM

## 2018-11-13 DIAGNOSIS — I10 ESSENTIAL HYPERTENSION: ICD-10-CM

## 2018-11-13 PROCEDURE — 99213 OFFICE O/P EST LOW 20 MIN: CPT | Performed by: PHYSICIAN ASSISTANT

## 2018-11-13 RX ORDER — AMOXICILLIN 500 MG/1
CAPSULE ORAL 3 TIMES DAILY
Refills: 0 | COMMUNITY
Start: 2018-10-31 | End: 2019-07-10

## 2018-11-13 NOTE — PATIENT INSTRUCTIONS
Heart-Healthy Eating Plan  Many factors influence your heart health, including eating and exercise habits. Heart (coronary) risk increases with abnormal blood fat (lipid) levels. Heart-healthy meal planning includes limiting unhealthy fats, increasing healthy fats, and making other small dietary changes. This includes maintaining a healthy body weight to help keep lipid levels within a normal range.  What is my plan?  Your health care provider recommends that you:  · Get no more than _________% of the total calories in your daily diet from fat.  · Limit your intake of saturated fat to less than _________% of your total calories each day.  · Limit the amount of cholesterol in your diet to less than _________ mg per day.    What types of fat should I choose?  · Choose healthy fats more often. Choose monounsaturated and polyunsaturated fats, such as olive oil and canola oil, flaxseeds, walnuts, almonds, and seeds.  · Eat more omega-3 fats. Good choices include salmon, mackerel, sardines, tuna, flaxseed oil, and ground flaxseeds. Aim to eat fish at least two times each week.  · Limit saturated fats. Saturated fats are primarily found in animal products, such as meats, butter, and cream. Plant sources of saturated fats include palm oil, palm kernel oil, and coconut oil.  · Avoid foods with partially hydrogenated oils in them. These contain trans fats. Examples of foods that contain trans fats are stick margarine, some tub margarines, cookies, crackers, and other baked goods.  What general guidelines do I need to follow?  · Check food labels carefully to identify foods with trans fats or high amounts of saturated fat.  · Fill one half of your plate with vegetables and green salads. Eat 4-5 servings of vegetables per day. A serving of vegetables equals 1 cup of raw leafy vegetables, ½ cup of raw or cooked cut-up vegetables, or ½ cup of vegetable juice.  · Fill one fourth of your plate with whole grains. Look for the word  "\"whole\" as the first word in the ingredient list.  · Fill one fourth of your plate with lean protein foods.  · Eat 4-5 servings of fruit per day. A serving of fruit equals one medium whole fruit, ¼ cup of dried fruit, ½ cup of fresh, frozen, or canned fruit, or ½ cup of 100% fruit juice.  · Eat more foods that contain soluble fiber. Examples of foods that contain this type of fiber are apples, broccoli, carrots, beans, peas, and barley. Aim to get 20-30 g of fiber per day.  · Eat more home-cooked food and less restaurant, buffet, and fast food.  · Limit or avoid alcohol.  · Limit foods that are high in starch and sugar.  · Avoid fried foods.  · Cook foods by using methods other than frying. Baking, boiling, grilling, and broiling are all great options. Other fat-reducing suggestions include:  ? Removing the skin from poultry.  ? Removing all visible fats from meats.  ? Skimming the fat off of stews, soups, and gravies before serving them.  ? Steaming vegetables in water or broth.  · Lose weight if you are overweight. Losing just 5-10% of your initial body weight can help your overall health and prevent diseases such as diabetes and heart disease.  · Increase your consumption of nuts, legumes, and seeds to 4-5 servings per week. One serving of dried beans or legumes equals ½ cup after being cooked, one serving of nuts equals 1½ ounces, and one serving of seeds equals ½ ounce or 1 tablespoon.  · You may need to monitor your salt (sodium) intake, especially if you have high blood pressure. Talk with your health care provider or dietitian to get more information about reducing sodium.  What foods can I eat?  Grains    Breads, including [place], white, viet, wheat, raisin, rye, oatmeal, and Italian. Tortillas that are neither fried nor made with lard or trans fat. Low-fat rolls, including hotdog and hamburger buns and English muffins. Biscuits. Muffins. Waffles. Pancakes. Light popcorn. Whole-grain cereals. Flatbread. " Krissy toast. Pretzels. Breadsticks. Rusks. Low-fat snacks and crackers, including oyster, saltine, matzo, jennifer, animal, and rye. Rice and pasta, including brown rice and those that are made with whole wheat.  Vegetables  All vegetables.  Fruits  All fruits, but limit coconut.  Meats and Other Protein Sources  Lean, well-trimmed beef, veal, pork, and lamb. Chicken and turkey without skin. All fish and shellfish. Wild duck, rabbit, pheasant, and venison. Egg whites or low-cholesterol egg substitutes. Dried beans, peas, lentils, and tofu. Seeds and most nuts.  Dairy  Low-fat or nonfat cheeses, including ricotta, string, and mozzarella. Skim or 1% milk that is liquid, powdered, or evaporated. Buttermilk that is made with low-fat milk. Nonfat or low-fat yogurt.  Beverages  Mineral water. Diet carbonated beverages.  Sweets and Desserts  Sherbets and fruit ices. Honey, jam, marmalade, jelly, and syrups. Meringues and gelatins. Pure sugar candy, such as hard candy, jelly beans, gumdrops, mints, marshmallows, and small amounts of dark chocolate. Fady food cake.  Eat all sweets and desserts in moderation.  Fats and Oils  Nonhydrogenated (trans-free) margarines. Vegetable oils, including soybean, sesame, sunflower, olive, peanut, safflower, corn, canola, and cottonseed. Salad dressings or mayonnaise that are made with a vegetable oil. Limit added fats and oils that you use for cooking, baking, salads, and as spreads.  Other  Cocoa powder. Coffee and tea. All seasonings and condiments.  The items listed above may not be a complete list of recommended foods or beverages. Contact your dietitian for more options.  What foods are not recommended?  Grains  Breads that are made with saturated or trans fats, oils, or whole milk. Croissants. Butter rolls. Cheese breads. Sweet rolls. Donuts. Buttered popcorn. Chow mein noodles. High-fat crackers, such as cheese or butter crackers.  Meats and Other Protein Sources  Fatty meats, such  as hotdogs, short ribs, sausage, spareribs, martell, ribeye roast or steak, and mutton. High-fat deli meats, such as salami and bologna. Caviar. Domestic duck and goose. Organ meats, such as kidney, liver, sweetbreads, brains, gizzard, chitterlings, and heart.  Dairy  Cream, sour cream, cream cheese, and creamed cottage cheese. Whole milk cheeses, including blue (shameka), [place] Sonido, Brie, Saman, American, Havarti, Swiss, cheddar, Camembert, and [place]. Whole or 2% milk that is liquid, evaporated, or condensed. Whole buttermilk. Cream sauce or high-fat cheese sauce. Yogurt that is made from whole milk.  Beverages  Regular sodas and drinks with added sugar.  Sweets and Desserts  Frosting. Pudding. Cookies. Cakes other than bandar food cake. Candy that has milk chocolate or white chocolate, hydrogenated fat, butter, coconut, or unknown ingredients. Buttered syrups. Full-fat ice cream or ice cream drinks.  Fats and Oils  Gravy that has suet, meat fat, or shortening. Cocoa butter, hydrogenated oils, palm oil, coconut oil, palm kernel oil. These can often be found in baked products, candy, fried foods, nondairy creamers, and whipped toppings. Solid fats and shortenings, including martell fat, salt pork, lard, and butter. Nondairy cream substitutes, such as coffee creamers and sour cream substitutes. Salad dressings that are made of unknown oils, cheese, or sour cream.  The items listed above may not be a complete list of foods and beverages to avoid. Contact your dietitian for more information.  This information is not intended to replace advice given to you by your health care provider. Make sure you discuss any questions you have with your health care provider.  Document Released: 09/26/2009 Document Revised: 07/07/2017 Document Reviewed: 06/11/2015  [employer] Patient Education © 2018 [employer]

## 2018-11-13 NOTE — PROGRESS NOTES
Problem list     Subjective   Sonido Dodd is a 82 y.o. male     Chief Complaint   Patient presents with   • Follow-up     presents for test f/u (stress, echo & carotid)   • Atrial Fibrillation   • Shortness of Breath   • Coronary Artery Disease   Problem List:      1. Coronary artery disease   1.1 Stenting to OM X 2 and LAD, 2007.  2. Atrial fibrillation   2.1 CHADS score of at least 2, patient is on anticoagulation therapy with Coumadin.  3. Conduction system disease   3.1 PPM Dual Chamber [facility] Jose F 2/2/16  4. Degenerative disc and joint disease.   5. Hypertension  6. Hyperlipidemia  7. Shortness of breath   7.1 Echo 7/12/17-moderate LVH, EF greater than 65%, diastolic dysfunction 2, aortic VTI 1.2; porcine bioprosthetic aortic valve is present; that was appears to function normally; trace MR  8. Valvular Heart Disease.  8.1 Aortic valve replacement a #21 [facility]. Jose F Trifecta valve.  The patient had concurrent VG to diagonal placement.   9.  Carotid artery disease      9.1 Nonobstructive carotid artery disease by recent duplex.     HPI  The patient presents back to review test results.  We had seen the patient previously because of chest pain and dizziness.  He was scheduled for stress test and an echo.  He was also schedule for carotid duplex.  Stress test suggested a very mild, small segment of lateral wall ischemia.  Echo suggested preserved systolic function and stable aortic valve replacement.  Carotid duplex suggested nonobstructive disease bilaterally.  Previous interrogation has indicated no significant dysrhythmic activity.  With review of him today, his chest discomfort is now very minimal.  He reports chest discomfort only at higher levels of activity.  He has stable dyspnea.  His dizziness is mostly vertigo by description.  He has no dysrhythmic symptoms.  Blood pressures are well controlled.  The only dizziness that he has of concern is with orthostasis.  This is managed with physical maneuvers  otherwise.    Current Outpatient Medications   Medication Sig Dispense Refill   • amoxicillin (AMOXIL) 500 MG capsule 3 (Three) Times a Day.  0   • aspirin 81 MG tablet Take 81 mg by mouth Daily.     • atorvastatin (LIPITOR) 40 MG tablet Take 40 mg by mouth Daily.     • benzonatate (TESSALON PERLES) 100 MG capsule Take 1 capsule by mouth 3 (Three) Times a Day As Needed for Cough. 30 capsule 2   • coenzyme Q10 100 MG capsule Take 200 mg by mouth Daily.     • levothyroxine (SYNTHROID, LEVOTHROID) 50 MCG tablet Take 50 mcg by mouth Daily.     • methocarbamol (ROBAXIN) 750 MG tablet Take 750 mg by mouth As Needed for Muscle Spasms.     • metroNIDAZOLE (METROCREAM) 0.75 % cream Take As Directed.  1   • montelukast (SINGULAIR) 10 MG tablet Take 10 mg by mouth Daily.     • Multiple Vitamin (ONE-A-DAY MENS) tablet Take 1 tablet by mouth daily.     • nitroglycerin (NITROSTAT) 0.4 MG SL tablet 1 under the tongue as needed for angina, may repeat q5mins for up three doses 25 tablet 2   • Omega-3 Fatty Acids (FISH OIL EXTRA STRENGTH) 1200 MG capsule Take  by mouth.     • raNITIdine (ZANTAC) 75 MG tablet Take 75 mg by mouth 2 (Two) Times a Day.     • warfarin (COUMADIN) 7.5 MG tablet TAKING 7.5 MG DAILY 90 tablet 3     No current facility-administered medications for this visit.        Ondansetron    Past Medical History:   Diagnosis Date   • Cancer (CMS/HCC)     malig. neoplasm of prostate   • Coronary artery disease    • GERD (gastroesophageal reflux disease)    • Hip discomfort     rt. hip s/p falls, spurs present per patient   • Hyperlipidemia    • Hypertension    • Pulmonary hypertension (CMS/[facility])    • Sleep apnea     uses c-pap   • Transient cerebral ischemia        Social History     Socioeconomic History   • Marital status:      Spouse name: Not on file   • Number of children: Not on file   • Years of education: Not on file   • Highest education level: Not on file   Social Needs   • Financial resource strain: Not  on file   • Food insecurity - worry: Not on file   • Food insecurity - inability: Not on file   • Transportation needs - medical: Not on file   • Transportation needs - non-medical: Not on file   Occupational History   • Not on file   Tobacco Use   • Smoking status: Never Smoker   • Smokeless tobacco: Never Used   Substance and Sexual Activity   • Alcohol use: Defer     Comment: denied   • Drug use: No   • Sexual activity: Defer   Other Topics Concern   • Not on file   Social History Narrative   • Not on file       Family History   Problem Relation Age of Onset   • Diabetes Mother    • Stroke Mother    • Cancer Father    • Cancer Sister    • Diabetes Brother    • Cancer Brother    • Other Brother         ACUTE MYOCARDIAL INFARCTION       Review of Systems   Constitutional: Positive for fatigue. Negative for chills, diaphoresis and fever.   HENT: Negative.  Negative for congestion, hearing loss, postnasal drip, rhinorrhea, sinus pressure, sinus pain, sneezing, sore throat and tinnitus.    Eyes: Positive for visual disturbance (wears glasses).   Respiratory: Positive for apnea (cpap) and shortness of breath (on exertion). Negative for chest tightness and wheezing.    Cardiovascular: Positive for leg swelling (minimal in ankles). Negative for chest pain and palpitations.   Gastrointestinal: Negative.  Negative for abdominal pain, blood in stool, constipation, diarrhea, nausea and vomiting.   Endocrine: Negative.    Genitourinary: Negative for hematuria.   Musculoskeletal: Positive for arthralgias, back pain, myalgias and neck pain.   Skin: Negative.    Allergic/Immunologic: Negative.  Negative for environmental allergies and food allergies.   Neurological: Positive for dizziness. Negative for syncope, weakness, light-headedness, numbness and headaches.   Hematological: Bruises/bleeds easily (both).   Psychiatric/Behavioral: Negative.  Negative for agitation and sleep disturbance. The patient is not nervous/anxious.   "      Objective   Vitals:    11/13/18 1434   BP: 135/83   BP Location: Right arm   Patient Position: Sitting   Pulse: 88   SpO2: 92%   Weight: 95.8 kg (211 lb 3.2 oz)   Height: 175.3 cm (69.02\")      /83 (BP Location: Right arm, Patient Position: Sitting)   Pulse 88   Ht 175.3 cm (69.02\")   Wt 95.8 kg (211 lb 3.2 oz)   SpO2 92%   BMI 31.17 kg/m²    Lab Results (most recent)     None        Physical Exam   Constitutional: He is oriented to person, place, and time. He appears well-developed and well-nourished. No distress.   HENT:   Head: Normocephalic and atraumatic.   Eyes: Conjunctivae and EOM are normal. Pupils are equal, round, and reactive to light.   Neck: Normal range of motion. Neck supple. No JVD present. No tracheal deviation present.   Cardiovascular: Normal rate, regular rhythm and intact distal pulses.   Murmur heard.   Systolic (Second RICS and LLSB.) murmur is present with a grade of 1/6.  Pulses:       Dorsalis pedis pulses are 1+ on the right side, and 1+ on the left side.        Posterior tibial pulses are 1+ on the right side, and 1+ on the left side.   Pulmonary/Chest: Effort normal and breath sounds normal.   Abdominal: Soft. Bowel sounds are normal. He exhibits no distension and no mass. There is no tenderness. There is no rebound and no guarding.   Musculoskeletal: Normal range of motion. He exhibits edema. He exhibits no tenderness or deformity.   Neurological: He is alert and oriented to person, place, and time.   Skin: Skin is warm and dry. No rash noted. No erythema. No pallor.   Psychiatric: He has a normal mood and affect. His behavior is normal. Judgment and thought content normal.   Nursing note and vitals reviewed.        Procedure   Procedures       Assessment/Plan      Diagnosis Plan   1. Coronary artery disease of native artery of native heart with stable angina pectoris (CMS/HCC)     2. Atrial fibrillation, unspecified type (CMS/HCC)     3. Shortness of breath     4. " Essential hypertension       The patient's echo and carotid findings are stable.  Stress test suggested a very small, mild segment of lateral ischemia.  The patient feels well at this time from cardiovascular standpoint.  I do not feel catheter is warranted at this time unless clinical course should change.  We will manage that medically.  He is on appropriate medications in my opinion I will make no changes.  We discussed physical maneuvers for his orthostasis.  I also reviewed with him that a lot of his major concerns is with vertigo.  He will follow with his primary care provider with that.  We will continue routine pacer interrogations through the clinic.  The patient will call for any issues.  Otherwise, we can see him back in 6 months, sooner for symptoms and signs of angina/ischemia.                Patient's Body mass index is 31.17 kg/m². BMI is above normal parameters. Recommendations include: educational material.             Electronically signed by:

## 2018-12-21 ENCOUNTER — OFFICE VISIT (OUTPATIENT)
Dept: CARDIOLOGY | Facility: CLINIC | Age: 83
End: 2018-12-21

## 2018-12-21 DIAGNOSIS — I49.5 TACHY-BRADY SYNDROME (HCC): Primary | ICD-10-CM

## 2018-12-21 LAB — INR PPP: 2.8 (ref 2–3)

## 2018-12-21 PROCEDURE — 93288 INTERROG EVL PM/LDLS PM IP: CPT | Performed by: INTERNAL MEDICINE

## 2019-01-01 ENCOUNTER — TELEPHONE (OUTPATIENT)
Dept: CARDIOLOGY | Facility: CLINIC | Age: 84
End: 2019-01-01

## 2019-01-01 ENCOUNTER — LAB (OUTPATIENT)
Dept: LAB | Facility: HOSPITAL | Age: 84
End: 2019-01-01

## 2019-01-01 ENCOUNTER — HOSPITAL ENCOUNTER (OUTPATIENT)
Dept: CARDIOLOGY | Facility: HOSPITAL | Age: 84
Discharge: HOME | End: 2019-12-18

## 2019-01-01 ENCOUNTER — ANTICOAGULATION VISIT (OUTPATIENT)
Dept: CARDIOLOGY | Facility: CLINIC | Age: 84
End: 2019-01-01

## 2019-01-01 ENCOUNTER — DOCUMENTATION (OUTPATIENT)
Dept: CARDIOLOGY | Facility: CLINIC | Age: 84
End: 2019-01-01

## 2019-01-01 ENCOUNTER — OFFICE VISIT (OUTPATIENT)
Dept: CARDIOLOGY | Facility: CLINIC | Age: 84
End: 2019-01-01

## 2019-01-01 VITALS
WEIGHT: 215 LBS | HEIGHT: 69 IN | DIASTOLIC BLOOD PRESSURE: 64 MMHG | BODY MASS INDEX: 31.84 KG/M2 | OXYGEN SATURATION: 94 % | HEART RATE: 84 BPM | SYSTOLIC BLOOD PRESSURE: 99 MMHG

## 2019-01-01 VITALS — WEIGHT: 214.95 LBS | BODY MASS INDEX: 31.84 KG/M2 | HEIGHT: 69 IN

## 2019-01-01 DIAGNOSIS — Z95.2 S/P AVR (AORTIC VALVE REPLACEMENT): ICD-10-CM

## 2019-01-01 DIAGNOSIS — I48.0 PAROXYSMAL ATRIAL FIBRILLATION (HCC): ICD-10-CM

## 2019-01-01 DIAGNOSIS — R42 DIZZINESS: ICD-10-CM

## 2019-01-01 DIAGNOSIS — I25.118 CORONARY ARTERY DISEASE OF NATIVE ARTERY OF NATIVE HEART WITH STABLE ANGINA PECTORIS (HCC): ICD-10-CM

## 2019-01-01 DIAGNOSIS — R06.02 SHORTNESS OF BREATH: ICD-10-CM

## 2019-01-01 DIAGNOSIS — R09.89 BILATERAL CAROTID BRUITS: ICD-10-CM

## 2019-01-01 DIAGNOSIS — I25.118 CORONARY ARTERY DISEASE OF NATIVE ARTERY OF NATIVE HEART WITH STABLE ANGINA PECTORIS (HCC): Primary | ICD-10-CM

## 2019-01-01 DIAGNOSIS — Z95.2 S/P AVR (AORTIC VALVE REPLACEMENT): Primary | ICD-10-CM

## 2019-01-01 LAB
ALBUMIN SERPL-MCNC: 4.09 G/DL (ref 3.5–5.2)
ALBUMIN/GLOB SERPL: 1.3 G/DL
ALP SERPL-CCNC: 81 U/L (ref 39–117)
ALT SERPL W P-5'-P-CCNC: 46 U/L (ref 1–41)
ANION GAP SERPL CALCULATED.3IONS-SCNC: 15.3 MMOL/L (ref 5–15)
AST SERPL-CCNC: 42 U/L (ref 1–40)
BASOPHILS # BLD AUTO: 0.02 10*3/MM3 (ref 0–0.2)
BASOPHILS NFR BLD AUTO: 0.3 % (ref 0–1.5)
BH CV STRESS COMMENTS STAGE 1: NORMAL
BH CV STRESS DOSE REGADENOSON STAGE 1: 0.4
BH CV STRESS DURATION MIN STAGE 1: 0
BH CV STRESS DURATION SEC STAGE 1: 10
BH CV STRESS PROTOCOL 1: NORMAL
BH CV STRESS RECOVERY BP: NORMAL MMHG
BH CV STRESS RECOVERY HR: 86 BPM
BH CV STRESS STAGE 1: 1
BILIRUB SERPL-MCNC: 0.5 MG/DL (ref 0.2–1.2)
BUN BLD-MCNC: 18 MG/DL (ref 8–23)
BUN/CREAT SERPL: 11.5 (ref 7–25)
CALCIUM SPEC-SCNC: 9.4 MG/DL (ref 8.6–10.5)
CHLORIDE SERPL-SCNC: 101 MMOL/L (ref 98–107)
CO2 SERPL-SCNC: 25.7 MMOL/L (ref 22–29)
CREAT BLD-MCNC: 1.56 MG/DL (ref 0.76–1.27)
DEPRECATED RDW RBC AUTO: 50.2 FL (ref 37–54)
EOSINOPHIL # BLD AUTO: 0.19 10*3/MM3 (ref 0–0.4)
EOSINOPHIL NFR BLD AUTO: 2.4 % (ref 0.3–6.2)
ERYTHROCYTE [DISTWIDTH] IN BLOOD BY AUTOMATED COUNT: 13.3 % (ref 12.3–15.4)
GFR SERPL CREATININE-BSD FRML MDRD: 43 ML/MIN/1.73
GLOBULIN UR ELPH-MCNC: 3.2 GM/DL
GLUCOSE BLD-MCNC: 103 MG/DL (ref 65–99)
HCT VFR BLD AUTO: 43.7 % (ref 37.5–51)
HGB BLD-MCNC: 14.6 G/DL (ref 13–17.7)
IMM GRANULOCYTES # BLD AUTO: 0.02 10*3/MM3 (ref 0–0.05)
IMM GRANULOCYTES NFR BLD AUTO: 0.3 % (ref 0–0.5)
INR PPP: 1.5 (ref 2–3)
INR PPP: 1.6 (ref 2–3)
INR PPP: 2.28 (ref 0.9–1.1)
INR PPP: 2.28 (ref 2–3)
INR PPP: 2.42 (ref 0.9–1.1)
INR PPP: 2.42 (ref 2–30)
INR PPP: 2.7 (ref 2–3)
LYMPHOCYTES # BLD AUTO: 1.48 10*3/MM3 (ref 0.7–3.1)
LYMPHOCYTES NFR BLD AUTO: 18.7 % (ref 19.6–45.3)
MAXIMAL PREDICTED HEART RATE: 136 BPM
MCH RBC QN AUTO: 34 PG (ref 26.6–33)
MCHC RBC AUTO-ENTMCNC: 33.4 G/DL (ref 31.5–35.7)
MCV RBC AUTO: 101.9 FL (ref 79–97)
MONOCYTES # BLD AUTO: 0.67 10*3/MM3 (ref 0.1–0.9)
MONOCYTES NFR BLD AUTO: 8.5 % (ref 5–12)
NEUTROPHILS # BLD AUTO: 5.52 10*3/MM3 (ref 1.7–7)
NEUTROPHILS NFR BLD AUTO: 69.8 % (ref 42.7–76)
NRBC BLD AUTO-RTO: 0 /100 WBC (ref 0–0.2)
PERCENT MAX PREDICTED HR: 63.97 %
PLATELET # BLD AUTO: 218 10*3/MM3 (ref 140–450)
PMV BLD AUTO: 9.4 FL (ref 6–12)
POTASSIUM BLD-SCNC: 4.7 MMOL/L (ref 3.5–5.2)
PROT SERPL-MCNC: 7.3 G/DL (ref 6–8.5)
PROTHROMBIN TIME: 26.2 SECONDS (ref 11–15.4)
PROTHROMBIN TIME: 27.5 SECONDS (ref 11–15.4)
RBC # BLD AUTO: 4.29 10*6/MM3 (ref 4.14–5.8)
SODIUM BLD-SCNC: 142 MMOL/L (ref 136–145)
STRESS BASELINE BP: NORMAL MMHG
STRESS BASELINE HR: 87 BPM
STRESS PERCENT HR: 75 %
STRESS POST PEAK BP: NORMAL MMHG
STRESS POST PEAK HR: 87 BPM
STRESS TARGET HR: 116 BPM
TSH SERPL DL<=0.05 MIU/L-ACNC: 4.27 UIU/ML (ref 0.27–4.2)
WBC NRBC COR # BLD: 7.9 10*3/MM3 (ref 3.4–10.8)

## 2019-01-01 PROCEDURE — 93880 EXTRACRANIAL BILAT STUDY: CPT

## 2019-01-01 PROCEDURE — 84443 ASSAY THYROID STIM HORMONE: CPT | Performed by: PHYSICIAN ASSISTANT

## 2019-01-01 PROCEDURE — 78452 HT MUSCLE IMAGE SPECT MULT: CPT

## 2019-01-01 PROCEDURE — 25010000002 REGADENOSON 0.4 MG/5ML SOLUTION: Performed by: INTERNAL MEDICINE

## 2019-01-01 PROCEDURE — 78452 HT MUSCLE IMAGE SPECT MULT: CPT | Performed by: INTERNAL MEDICINE

## 2019-01-01 PROCEDURE — 93880 EXTRACRANIAL BILAT STUDY: CPT | Performed by: INTERNAL MEDICINE

## 2019-01-01 PROCEDURE — 93016 CV STRESS TEST SUPVJ ONLY: CPT | Performed by: NURSE PRACTITIONER

## 2019-01-01 PROCEDURE — 93306 TTE W/DOPPLER COMPLETE: CPT | Performed by: INTERNAL MEDICINE

## 2019-01-01 PROCEDURE — 0 TECHNETIUM SESTAMIBI: Performed by: INTERNAL MEDICINE

## 2019-01-01 PROCEDURE — 85610 PROTHROMBIN TIME: CPT | Performed by: PHYSICIAN ASSISTANT

## 2019-01-01 PROCEDURE — 80053 COMPREHEN METABOLIC PANEL: CPT | Performed by: PHYSICIAN ASSISTANT

## 2019-01-01 PROCEDURE — 93017 CV STRESS TEST TRACING ONLY: CPT

## 2019-01-01 PROCEDURE — 85025 COMPLETE CBC W/AUTO DIFF WBC: CPT | Performed by: PHYSICIAN ASSISTANT

## 2019-01-01 PROCEDURE — 93000 ELECTROCARDIOGRAM COMPLETE: CPT | Performed by: PHYSICIAN ASSISTANT

## 2019-01-01 PROCEDURE — A9500 TC99M SESTAMIBI: HCPCS | Performed by: INTERNAL MEDICINE

## 2019-01-01 PROCEDURE — 93306 TTE W/DOPPLER COMPLETE: CPT

## 2019-01-01 PROCEDURE — 93018 CV STRESS TEST I&R ONLY: CPT | Performed by: INTERNAL MEDICINE

## 2019-01-01 PROCEDURE — 99214 OFFICE O/P EST MOD 30 MIN: CPT | Performed by: PHYSICIAN ASSISTANT

## 2019-01-01 PROCEDURE — 36415 COLL VENOUS BLD VENIPUNCTURE: CPT

## 2019-01-01 RX ADMIN — TECHNETIUM TC 99M SESTAMIBI 1 DOSE: 1 INJECTION INTRAVENOUS at 10:12

## 2019-01-01 RX ADMIN — REGADENOSON 0.4 MG: 0.08 INJECTION, SOLUTION INTRAVENOUS at 10:12

## 2019-01-01 RX ADMIN — TECHNETIUM TC 99M SESTAMIBI 1 DOSE: 1 INJECTION INTRAVENOUS at 10:11

## 2019-01-24 ENCOUNTER — ANTICOAGULATION VISIT (OUTPATIENT)
Dept: CARDIOLOGY | Facility: CLINIC | Age: 84
End: 2019-01-24

## 2019-01-24 NOTE — PROGRESS NOTES
CALLED PATIENT TO REMIND HIM INR PAST DUE AND STATED HE HAS HAD IT DRAWN APPROX. 1 MO. AGO. INR 2.8 PER [facility] CALLED TODAY TO GET RESULTS SINCE RESULTS NEVER FAXED TO THE OFFICE. NO DOSAGE CHANGE AND RECHECK LEVEL IN 1 MO.FROM ABOVE DATE DRAWN PER LINDSEY NARAYAN. VERBAL ORDERS READ BACK AND VERIFIED BY LINDSEY NARAYAN. PATIENT AWARE VERBALIZED OK, STATES WILL GO ONE DAY NEXT WEEK TO HAVE DRAWN. PH,LPN

## 2019-02-27 ENCOUNTER — ANTICOAGULATION VISIT (OUTPATIENT)
Dept: CARDIOLOGY | Facility: CLINIC | Age: 84
End: 2019-02-27

## 2019-02-27 LAB — INR PPP: 3.5 (ref 2–3)

## 2019-02-27 NOTE — PROGRESS NOTES
NO DOSAGE CHANGE AND RECHECK LEVEL IN 1 MO. PER CHAKA YBARRA, PAC. VERBAL ORDERS READ BACK AND VERIFIED BY CHAKA YBARRA, PAC. PATIENT AWARE VERBALIZED OK. PH,LPN

## 2019-05-02 ENCOUNTER — DOCUMENTATION (OUTPATIENT)
Dept: CARDIOLOGY | Facility: CLINIC | Age: 84
End: 2019-05-02

## 2019-05-02 NOTE — PROGRESS NOTES
ATTEMPTED TO CALL PATIENT AT HOME NUMBER IN CHART, BUT NO ANSWER TO REMIND HIM INR WAY PAST DUE. PH,LPN

## 2019-05-15 ENCOUNTER — ANTICOAGULATION VISIT (OUTPATIENT)
Dept: CARDIOLOGY | Facility: CLINIC | Age: 84
End: 2019-05-15

## 2019-05-15 LAB — INR PPP: 1.8 (ref 2–3)

## 2019-06-13 ENCOUNTER — ANTICOAGULATION VISIT (OUTPATIENT)
Dept: CARDIOLOGY | Facility: CLINIC | Age: 84
End: 2019-06-13

## 2019-06-13 LAB — INR PPP: 1.7 (ref 2–3)

## 2019-06-13 NOTE — PROGRESS NOTES
PATIENT JUST FINISHED ABX'S ON Monday. PLUS HAS STILL BEEN TAKING 1/2 OF A 7.5 MG TAB ON R'S INSTEAD OF 7.5 MG DAILY. PER AVINASH POE, PAC CHANGE TO 7.5 MG DAILY AND RECHECK LEVEL IN 2 WEEKS. V/O'S READ BACK AND VERIFIED BY AVINASH POE, LINDSEY. PATIENT AWARE, VERBALIZED OK. PH,LPN

## 2019-06-14 DIAGNOSIS — Z95.2 AORTIC VALVE REPLACED: ICD-10-CM

## 2019-06-14 DIAGNOSIS — I48.91 ATRIAL FIBRILLATION, UNSPECIFIED TYPE (HCC): ICD-10-CM

## 2019-06-14 RX ORDER — WARFARIN SODIUM 7.5 MG/1
TABLET ORAL
Qty: 90 TABLET | Refills: 3 | Status: SHIPPED | OUTPATIENT
Start: 2019-06-14 | End: 2020-01-01 | Stop reason: ALTCHOICE

## 2019-07-10 ENCOUNTER — OFFICE VISIT (OUTPATIENT)
Dept: CARDIOLOGY | Facility: CLINIC | Age: 84
End: 2019-07-10

## 2019-07-10 VITALS
HEART RATE: 85 BPM | DIASTOLIC BLOOD PRESSURE: 72 MMHG | SYSTOLIC BLOOD PRESSURE: 140 MMHG | BODY MASS INDEX: 31.61 KG/M2 | WEIGHT: 213.4 LBS | OXYGEN SATURATION: 95 % | HEIGHT: 69 IN

## 2019-07-10 DIAGNOSIS — Z95.2 S/P AVR (AORTIC VALVE REPLACEMENT): ICD-10-CM

## 2019-07-10 DIAGNOSIS — Z95.0 PACEMAKER: ICD-10-CM

## 2019-07-10 DIAGNOSIS — I25.10 CORONARY ARTERY DISEASE INVOLVING NATIVE CORONARY ARTERY OF NATIVE HEART WITHOUT ANGINA PECTORIS: Primary | ICD-10-CM

## 2019-07-10 DIAGNOSIS — I48.0 PAROXYSMAL ATRIAL FIBRILLATION (HCC): ICD-10-CM

## 2019-07-10 PROCEDURE — 99213 OFFICE O/P EST LOW 20 MIN: CPT | Performed by: PHYSICIAN ASSISTANT

## 2019-07-10 RX ORDER — HYDROCODONE BITARTRATE AND ACETAMINOPHEN 10; 325 MG/1; MG/1
1 TABLET ORAL 3 TIMES DAILY
Refills: 0 | COMMUNITY
Start: 2019-06-20

## 2019-07-10 RX ORDER — HYDROXYCHLOROQUINE SULFATE 200 MG/1
200 TABLET, FILM COATED ORAL 2 TIMES DAILY
COMMUNITY
Start: 2019-05-17

## 2019-07-10 NOTE — PATIENT INSTRUCTIONS

## 2019-07-10 NOTE — PROGRESS NOTES
Problem list     Subjective   Sonido Dodd is a 83 y.o. male     Chief Complaint   Patient presents with   • Coronary Artery Disease   Problem List:      1. Coronary artery disease   1.1 Stenting to OM X 2 and LAD, 2007.  2. Atrial fibrillation   2.1 CHADS score of at least 2, patient is on anticoagulation therapy with Coumadin.  3. Conduction system disease   3.1 PPM Dual Chamber St. Jose F 2/2/16  4. Degenerative disc and joint disease.   5. Hypertension  6. Hyperlipidemia  7. Shortness of breath   7.1 Echo 7/12/17-moderate LVH, EF greater than 65%, diastolic dysfunction 2, aortic VTI 1.2; porcine bioprosthetic aortic valve is present; that was appears to function normally; trace MR  8. Valvular Heart Disease.  8.1 Aortic valve replacement a #21 [facility] Jose F Trifecta valve.  The patient had concurrent VG to diagonal placement.   9.  Carotid artery disease      9.1 Nonobstructive carotid artery disease by recent duplex.     HPI    The patient presents in today for evaluation of follow-up.  He continues to do well for the most part from cardiovascular standpoint.  We had seen him last evaluation to review stress and echo findings.  Stress test indicated a possible small segment of lateral wall ischemia.  The patient's echo suggested stable aortic valve and stable cardiac parameters otherwise.  His he was doing well clinically, we did not pursue catheterization.  He has continued to do well otherwise.  He reports no significant dysrhythmic activity at this time.  He is tolerating anticoagulation without complication.  He reports no significant chest pain.  He has stable dyspnea.  He has no failure symptoms.  He has no further complaints otherwise.  He does note that his blood pressures have been fairly well controlled when checked at home.  Otherwise, recent interrogation that I have available suggests stable device function.  Current Outpatient Medications   Medication Sig Dispense Refill   • aspirin 81 MG tablet Take 81  mg by mouth Daily.     • atorvastatin (LIPITOR) 40 MG tablet Take 40 mg by mouth Daily.     • coenzyme Q10 100 MG capsule Take 200 mg by mouth Daily.     • HYDROcodone-acetaminophen (NORCO)  MG per tablet Take 1 tablet by mouth 3 (Three) Times a Day.  0   • hydroxychloroquine (PLAQUENIL) 200 MG tablet Take 200 mg by mouth 2 (Two) Times a Day.     • levothyroxine (SYNTHROID, LEVOTHROID) 50 MCG tablet Take 50 mcg by mouth Daily.     • methocarbamol (ROBAXIN) 750 MG tablet Take 750 mg by mouth As Needed for Muscle Spasms.     • metroNIDAZOLE (METROCREAM) 0.75 % cream Take As Directed.  1   • montelukast (SINGULAIR) 10 MG tablet Take 10 mg by mouth Daily.     • Multiple Vitamin (ONE-A-DAY MENS) tablet Take 1 tablet by mouth daily.     • nitroglycerin (NITROSTAT) 0.4 MG SL tablet 1 under the tongue as needed for angina, may repeat q5mins for up three doses 25 tablet 2   • raNITIdine (ZANTAC) 75 MG tablet Take 75 mg by mouth 2 (Two) Times a Day.     • warfarin (COUMADIN) 7.5 MG tablet TAKING 7.5 MG DAILY 90 tablet 3     No current facility-administered medications for this visit.        Ondansetron    Past Medical History:   Diagnosis Date   • Cancer (CMS/HCC)     malig. neoplasm of prostate   • Coronary artery disease    • GERD (gastroesophageal reflux disease)    • Hip discomfort     rt. hip s/p falls, spurs present per patient   • Hyperlipidemia    • Hypertension    • Pulmonary hypertension (CMS/[facility])    • Sleep apnea     uses c-pap   • Transient cerebral ischemia        Social History     Socioeconomic History   • Marital status:      Spouse name: Not on file   • Number of children: Not on file   • Years of education: Not on file   • Highest education level: Not on file   Tobacco Use   • Smoking status: Never Smoker   • Smokeless tobacco: Never Used   Substance and Sexual Activity   • Alcohol use: Defer     Comment: denied   • Drug use: No   • Sexual activity: Defer       Family History   Problem Relation  "Age of Onset   • Diabetes Mother    • Stroke Mother    • Cancer Father    • Cancer Sister    • Diabetes Brother    • Cancer Brother    • Other Brother         ACUTE MYOCARDIAL INFARCTION       Review of Systems   Constitutional: Negative.  Negative for fatigue.   HENT: Positive for rhinorrhea (runny nose from allergies). Negative for congestion and sneezing.    Eyes: Positive for visual disturbance (wears glasses daily).   Respiratory: Positive for shortness of breath (easily SOA ; worse on exertion ). Negative for cough, chest tightness and wheezing.    Cardiovascular: Negative.  Negative for chest pain, palpitations and leg swelling.   Gastrointestinal: Negative.  Negative for abdominal distention, anal bleeding, nausea and vomiting.   Endocrine: Negative.  Negative for cold intolerance and heat intolerance.   Genitourinary: Positive for frequency (urinary urgency in PM). Negative for difficulty urinating and urgency.   Musculoskeletal: Positive for arthralgias (joints). Negative for back pain, neck pain and neck stiffness.   Skin: Negative.  Negative for rash and wound.   Allergic/Immunologic: Negative.  Negative for environmental allergies and food allergies.   Neurological: Negative.  Negative for dizziness, syncope, weakness, light-headedness and headaches.   Hematological: Bruises/bleeds easily (bruises and bleeds easily).   Psychiatric/Behavioral: Negative.  Negative for agitation, confusion and sleep disturbance (denies waking up smothering/SOA). The patient is not nervous/anxious.        Objective   Vitals:    07/10/19 1403   BP: 140/72   BP Location: Left arm   Patient Position: Sitting   Pulse: 85   SpO2: 95%   Weight: 96.8 kg (213 lb 6.4 oz)   Height: 175.3 cm (69\")      /72 (BP Location: Left arm, Patient Position: Sitting)   Pulse 85   Ht 175.3 cm (69\")   Wt 96.8 kg (213 lb 6.4 oz)   SpO2 95%   BMI 31.51 kg/m²    Lab Results (most recent)     None        Physical Exam   Constitutional: He " is oriented to person, place, and time. He appears well-developed and well-nourished. No distress.   HENT:   Head: Normocephalic and atraumatic.   Eyes: Conjunctivae and EOM are normal. Pupils are equal, round, and reactive to light.   Neck: Normal range of motion. Neck supple. No JVD present. No tracheal deviation present.   Cardiovascular: Normal rate, regular rhythm and intact distal pulses.   Murmur heard.   Systolic (Second RICS and LLSB.) murmur is present with a grade of 1/6.  Pulses:       Dorsalis pedis pulses are 1+ on the right side, and 1+ on the left side.        Posterior tibial pulses are 1+ on the right side, and 1+ on the left side.   Pulmonary/Chest: Effort normal and breath sounds normal.   Abdominal: Soft. Bowel sounds are normal. He exhibits no distension and no mass. There is no tenderness. There is no rebound and no guarding.   Musculoskeletal: Normal range of motion. He exhibits edema. He exhibits no tenderness or deformity.   Neurological: He is alert and oriented to person, place, and time.   Skin: Skin is warm and dry. No rash noted. No erythema. No pallor.   Psychiatric: He has a normal mood and affect. His behavior is normal. Judgment and thought content normal.   Nursing note and vitals reviewed.        Procedure   Procedures       Assessment/Plan      Diagnosis Plan   1. Coronary artery disease involving native coronary artery of native heart without angina pectoris     2. S/P AVR (aortic valve replacement)     3. Paroxysmal atrial fibrillation (CMS/HCC)     4. Pacemaker       1.  The patient is stable with regards to coronary artery disease.  Last stress test questioned a small segment of lateral wall ischemia, but in the absence of high risk markers and an overall stable clinical course, the patient does not want to pursue catheterization.  He feels that he is doing very well clinically.  He reports that he is very concerned about renal function as well.  As he is doing so well with  that and he has no high risk markers by stress test, I feel that we can continue to treat him medically and follow him clinically.    2.  His previous echocardiogram suggested stable aortic valve replacement and stable cardiac parameters otherwise.  We will follow that longitudinally through the clinic.  Nothing further in that regard at this time.    3.  The patient is on appropriate medications for A. fib.  He has beta-blocker which he takes just as needed for pulse rate excursions at home correlating with A. fib.  He only experiences this once every month or so, as confirmed by last interrogation.  He will continue that therapy without change at this time.    4.  His last interrogation otherwise suggested stable pacemaker function.  We will continue routine six-month checks of the same.    5.  I feel the patient is clinically stable for now.  We will make no changes and continue to see him on 6-month intervals.  He will call for any issues prior to follow-up.              Patient's Body mass index is 31.51 kg/m². BMI is above normal parameters. Recommendations include: educational material and referral to primary care.             Electronically signed by:

## 2019-07-11 ENCOUNTER — TELEPHONE (OUTPATIENT)
Dept: CARDIOLOGY | Facility: CLINIC | Age: 84
End: 2019-07-11

## 2019-07-11 NOTE — TELEPHONE ENCOUNTER
7/8/19 Fax received from [facility] requesting cardiac clearance for extraction not yet scheduled .  On the desk of Huy Seals PA-C for review.  MAGGI,GAYATHRI

## 2019-07-16 NOTE — TELEPHONE ENCOUNTER
Faxed Cardiac clearance letter per Huy Seals PA-C to [facility].  KH,CMA

## 2019-07-23 NOTE — PROGRESS NOTES
PATIENT STATES HE MISSED A DOSE AND HAS BEEN OFF ABX'S NOW FOR 5 DAYS. PER CHAKA YBARRA, PAC TAKE AN EXTRA 1/2 TAB TODAY THEN RESUME PREVIOUS DOSING AND RECHECK LEVEL IN 1 WEEK. VERBAL ORDERS READ BACK AND VERIFIED BY LINDSEY NRAAYAN. PATIENT AWARE, VERBALIZED OK. PH,LPN

## 2019-09-03 NOTE — PROGRESS NOTES
RESULTS NEVER RECEIVED IN THE OFFICE. CALLED FOR TODAY AFTER PATIENT CALLED AND HADN'T HEARD ABOUT RESULTS. NO DOSAGE CHANGE AND RECHECK LEVEL IN 2 WEEKS PER LINDSEY NARAYAN. VERBAL ORDERS READ BACK AND VERIFIED BY LINDSEY NARAYAN. L/M AT HOME NUMBER WITH ABOVE SINCE I HAVE SPOKEN WITH HIM AT THIS NUMBER IN THE PAST. PH,LPN

## 2019-10-10 NOTE — TELEPHONE ENCOUNTER
"----- Message from Maria Teresa Gorman sent at 10/10/2019 10:46 AM EDT -----  Patient left a voicemail stating, \"I need to get in contact with Dr. Tuttle or Huy as soon as possible, it is urgent.\" No other information was given about what it was regarding.   "

## 2019-10-10 NOTE — TELEPHONE ENCOUNTER
Per chart review, pt last saw Huy on 7/10/19 and is scheduled to follow up on 12/10/19.         Pt's wife answered the phone and stated pt was unavailable, I asked what was urgent. She stated she thinks it's related to shots in his stomach and a root canal. I informed her to please tell him that any message he leaves need to have a reason left and not to say it's urgent, as no urgent calls should be left on voicemail. She verbalized understanding.       Per chart review, pt isn't on any injectable medications. Have not received a cardiac clearance regd pt's coumadin.

## 2019-10-11 NOTE — TELEPHONE ENCOUNTER
Spoke w/ pt, he states that he got a root canal and wanted to know if he needed shots. I told him to have the dentist send us a surgical clearance, he stated the root canal has already been done.   I asked how he was feeling and if he had any bleeding, he stated he was fine.       I advised pt that in the future he could have any possible procedure office send us a clearance. States not to leave voicemails saying urgent- if he needs assistance quickly to select option 2 and tell the  the situation. Pt verbalized understanding.

## 2019-10-25 NOTE — PROGRESS NOTES
HAS BEEN ON AB'X X 4-5 DAYS FOR 7 DAYS. PER CHAKA YBARRA, PAC TAKE EXTRA 1/2 TAB OF A 7.5 MG TAB THEN RESUME PREVIOUS DOSING AND RECHECK LEVEL IN 2 WEEKS. VERBAL ORDERS READ BACK AND VERIFIED BY LINDSEY NARAYAN. PATIENT AWARE, VERBALIZED OK. PH,LPN

## 2019-11-05 NOTE — TELEPHONE ENCOUNTER
PATIENT DOWNSTAIRS GETTING PT/INR DRAWN NEEDING NEW STANDING ORDER. OK PER CHAKA YBARRA, PAC TO ORDER NEW STANDING ORDER. PH,LPN

## 2019-12-10 NOTE — PATIENT INSTRUCTIONS
"Fat and Cholesterol Restricted Eating Plan  Getting too much fat and cholesterol in your diet may cause health problems. Choosing the right foods helps keep your fat and cholesterol at normal levels. This can keep you from getting certain diseases.  Your doctor may recommend an eating plan that includes:  · Total fat: ______% or less of total calories a day.  · Saturated fat: ______% or less of total calories a day.  · Cholesterol: less than _________mg a day.  · Fiber: ______g a day.  What are tips for following this plan?  Meal planning  · At meals, divide your plate into four equal parts:  ? Fill one-half of your plate with vegetables and green salads.  ? Fill one-fourth of your plate with whole grains.  ? Fill one-fourth of your plate with low-fat (lean) protein foods.  · Eat fish that is high in omega-3 fats at least two times a week. This includes mackerel, tuna, sardines, and salmon.  · Eat foods that are high in fiber, such as whole grains, beans, apples, broccoli, carrots, peas, and barley.  General tips    · Work with your doctor to lose weight if you need to.  · Avoid:  ? Foods with added sugar.  ? Fried foods.  ? Foods with partially hydrogenated oils.  · Limit alcohol intake to no more than 1 drink a day for nonpregnant women and 2 drinks a day for men. One drink equals 12 oz of beer, 5 oz of wine, or 1½ oz of hard liquor.  Reading food labels  · Check food labels for:  ? Trans fats.  ? Partially hydrogenated oils.  ? Saturated fat (g) in each serving.  ? Cholesterol (mg) in each serving.  ? Fiber (g) in each serving.  · Choose foods with healthy fats, such as:  ? Monounsaturated fats.  ? Polyunsaturated fats.  ? Omega-3 fats.  · Choose grain products that have whole grains. Look for the word \"whole\" as the first word in the ingredient list.  Cooking  · Cook foods using low-fat methods. These include baking, boiling, grilling, and broiling.  · Eat more home-cooked foods. Eat at restaurants and buffets " less often.  · Avoid cooking using saturated fats, such as butter, cream, palm oil, palm kernel oil, and coconut oil.  Recommended foods    Fruits  · All fresh, canned (in natural juice), or frozen fruits.  Vegetables  · Fresh or frozen vegetables (raw, steamed, roasted, or grilled). Green salads.  Grains  · Whole grains, such as whole wheat or whole grain breads, crackers, cereals, and pasta. Unsweetened oatmeal, bulgur, barley, quinoa, or brown rice. Corn or whole wheat flour tortillas.  Meats and other protein foods  · Ground beef (85% or leaner), grass-fed beef, or beef trimmed of fat. Skinless chicken or turkey. Ground chicken or turkey. Pork trimmed of fat. All fish and seafood. Egg whites. Dried beans, peas, or lentils. Unsalted nuts or seeds. Unsalted canned beans. Nut butters without added sugar or oil.  Dairy  · Low-fat or nonfat dairy products, such as skim or 1% milk, 2% or reduced-fat cheeses, low-fat and fat-free ricotta or cottage cheese, or plain low-fat and nonfat yogurt.  Fats and oils  · Tub margarine without trans fats. Light or reduced-fat mayonnaise and salad dressings. Avocado. Olive, canola, sesame, or safflower oils.  The items listed above may not be a complete list of foods and beverages you can eat. Contact a dietitian for more information.  Foods to avoid  Fruits  · Canned fruit in heavy syrup. Fruit in cream or butter sauce. Fried fruit.  Vegetables  · Vegetables cooked in cheese, cream, or butter sauce. Fried vegetables.  Grains  · White bread. White pasta. White rice. Cornbread. Bagels, pastries, and croissants. Crackers and snack foods that contain trans fat and hydrogenated oils.  Meats and other protein foods  · Fatty cuts of meat. Ribs, chicken wings, martell, sausage, bologna, salami, chitterlings, fatback, hot dogs, bratwurst, and packaged lunch meats. Liver and organ meats. Whole eggs and egg yolks. Chicken and turkey with skin. Fried meat.  Dairy  · Whole or 2% milk, cream,  half-and-half, and cream cheese. Whole milk cheeses. Whole-fat or sweetened yogurt. Full-fat cheeses. Nondairy creamers and whipped toppings. Processed cheese, cheese spreads, and cheese curds.  Beverages  · Alcohol. Sugar-sweetened drinks such as sodas, lemonade, and fruit drinks.  Fats and oils  · Butter, stick margarine, lard, shortening, ghee, or martell fat. Coconut, palm kernel, and palm oils.  Sweets and desserts  · Corn syrup, sugars, honey, and molasses. Candy. Jam and jelly. Syrup. Sweetened cereals. Cookies, pies, cakes, donuts, muffins, and ice cream.  The items listed above may not be a complete list of foods and beverages you should avoid. Contact a dietitian for more information.  Summary  · Choosing the right foods helps keep your fat and cholesterol at normal levels. This can keep you from getting certain diseases.  · At meals, fill one-half of your plate with vegetables and green salads.  · Eat high-fiber foods, like whole grains, beans, apples, carrots, peas, and barley.  · Limit added sugar, saturated fats, alcohol, and fried foods.  This information is not intended to replace advice given to you by your health care provider. Make sure you discuss any questions you have with your health care provider.  Document Released: 06/18/2013 Document Revised: 08/21/2019 Document Reviewed: 09/04/2018  [employer] Interactive Patient Education © 2019 [employer]  BMI for Adults    Body mass index (BMI) is a number that is calculated from a person's weight and height. BMI may help to estimate how much of a person's weight is composed of fat. BMI can help identify those who may be at higher risk for certain medical problems.  How is BMI used with adults?  BMI is used as a screening tool to identify possible weight problems. It is used to check whether a person is obese, overweight, healthy weight, or underweight.  How is BMI calculated?  BMI measures your weight and compares it to your height. This can be done  "either in English ([place] or metric measurements. Note that charts are available to help you find your BMI quickly and easily without having to do these calculations yourself.  To calculate your BMI in English ([place] measurements, your health care provider will:  1. Measure your weight in pounds (lb).  2. Multiply the number of pounds by 703.  ? For example, for a person who weighs 180 lb, multiply that number by 703, which equals 126,540.  3. Measure your height in inches (in). Then multiply that number by itself to get a measurement called \"inches squared.\"  ? For example, for a person who is 70 in tall, the \"inches squared\" measurement is 70 in x 70 in, which equals 4900 inches squared.  4. Divide the total from Step 2 (number of lb x 703) by the total from Step 3 (inches squared): 126,540 ÷ 4900 = 25.8. This is your BMI.  To calculate your BMI in metric measurements, your health care provider will:  1. Measure your weight in kilograms (kg).  2. Measure your height in meters (m). Then multiply that number by itself to get a measurement called \"meters squared.\"  ? For example, for a person who is 1.75 m tall, the \"meters squared\" measurement is 1.75 m x 1.75 m, which is equal to 3.1 meters squared.  3. Divide the number of kilograms (your weight) by the meters squared number. In this example: 70 ÷ 3.1 = 22.6. This is your BMI.  How is BMI interpreted?  To interpret your results, your health care provider will use BMI charts to identify whether you are underweight, normal weight, overweight, or obese. The following guidelines will be used:  · Underweight: BMI less than 18.5.  · Normal weight: BMI between 18.5 and 24.9.  · Overweight: BMI between 25 and 29.9.  · Obese: BMI of 30 and above.  Please note:  · Weight includes both fat and muscle, so someone with a muscular build, such as an athlete, may have a BMI that is higher than 24.9. In cases like these, BMI is not an accurate measure of body fat.  · To determine " if excess body fat is the cause of a BMI of 25 or higher, further assessments may need to be done by a health care provider.  · BMI is usually interpreted in the same way for men and women.  Why is BMI a useful tool?  BMI is useful in two ways:  · Identifying a weight problem that may be related to a medical condition, or that may increase the risk for medical problems.  · Promoting lifestyle and diet changes in order to reach a healthy weight.  Summary  · Body mass index (BMI) is a number that is calculated from a person's weight and height.  · BMI may help to estimate how much of a person's weight is composed of fat. BMI can help identify those who may be at higher risk for certain medical problems.  · BMI can be measured using English measurements or metric measurements.  · To interpret your results, your health care provider will use BMI charts to identify whether you are underweight, normal weight, overweight, or obese.  This information is not intended to replace advice given to you by your health care provider. Make sure you discuss any questions you have with your health care provider.  Document Released: 08/29/2005 Document Revised: 10/31/2018 Document Reviewed: 10/31/2018  [employer] Interactive Patient Education © 2019 [employer]

## 2019-12-10 NOTE — PROGRESS NOTES
Problem list     Subjective   Sonido Dodd is a 84 y.o. male     Chief Complaint   Patient presents with   • Coronary Artery Disease     Here for a follow up    • Atrial Fibrillation   Problem List:      1. Coronary artery disease   1.1 Stenting to OM X 2 and LAD, 2007.  2. Atrial fibrillation   2.1 CHADS score of at least 2, patient is on anticoagulation therapy with Coumadin.  3. Conduction system disease   3.1 PPM Dual Chamber [facility] F 2/2/16  4. Degenerative disc and joint disease.   5. Hypertension  6. Hyperlipidemia  7. Shortness of breath   7.1 Echo 7/12/17-moderate LVH, EF greater than 65%, diastolic dysfunction 2, aortic VTI 1.2; porcine bioprosthetic aortic valve is present; that was appears to function normally; trace MR  8. Valvular Heart Disease.  8.1 Aortic valve replacement a #21 [facility] F Trifecta valve.  The patient had concurrent VG to diagonal placement.   9.  Carotid artery disease      9.1 Nonobstructive carotid artery disease by recent duplex.     HPI  The patient presents in today for evaluation of follow-up.  He just has not done well recently.  He has had significant fatigue.  He has had significant dyspnea recently.  He also feels that he has had slight increasing in lower extremity edema.  He is concerned about failure, he has no evidence of pulmonary edema by exam today however.  He has ongoing chest discomfort which is worsened recently.  He reports increasing chest tightness and pressure.  He has had no associated neck, arm, or jaw discomfort.  Symptoms appear to be occurring with exertion.  His weakness is present all day long.  This is getting worse as well.  He has no dysrhythmic symptoms of any significance.  He has no further complaints.  He does note that he has had recurrent hypotension.  He feels that this could be a lot of his complications at this time.    Current Outpatient Medications on File Prior to Visit   Medication Sig Dispense Refill   • aspirin 81 MG tablet Take 81 mg  by mouth Daily.     • atorvastatin (LIPITOR) 40 MG tablet Take 40 mg by mouth Daily.     • coenzyme Q10 100 MG capsule Take 200 mg by mouth Daily.     • HYDROcodone-acetaminophen (NORCO)  MG per tablet Take 1 tablet by mouth 3 (Three) Times a Day.  0   • hydroxychloroquine (PLAQUENIL) 200 MG tablet Take 200 mg by mouth 2 (Two) Times a Day.     • levothyroxine (SYNTHROID, LEVOTHROID) 50 MCG tablet Take 50 mcg by mouth Daily.     • methocarbamol (ROBAXIN) 750 MG tablet Take 750 mg by mouth As Needed for Muscle Spasms.     • metroNIDAZOLE (METROCREAM) 0.75 % cream Take As Directed.  1   • montelukast (SINGULAIR) 10 MG tablet Take 10 mg by mouth Daily.     • Multiple Vitamin (ONE-A-DAY MENS) tablet Take 1 tablet by mouth daily.     • nitroglycerin (NITROSTAT) 0.4 MG SL tablet 1 under the tongue as needed for angina, may repeat q5mins for up three doses 25 tablet 2   • raNITIdine (ZANTAC) 75 MG tablet Take 75 mg by mouth 2 (Two) Times a Day.     • warfarin (COUMADIN) 7.5 MG tablet TAKING 7.5 MG DAILY 90 tablet 3     No current facility-administered medications on file prior to visit.        Ondansetron    Past Medical History:   Diagnosis Date   • Cancer (CMS/HCC)     malig. neoplasm of prostate   • Coronary artery disease    • GERD (gastroesophageal reflux disease)    • Hip discomfort     rt. hip s/p falls, spurs present per patient   • Hyperlipidemia    • Hypertension    • Pulmonary hypertension (CMS/[facility])    • Sleep apnea     uses c-pap   • Transient cerebral ischemia        Social History     Socioeconomic History   • Marital status:      Spouse name: Not on file   • Number of children: Not on file   • Years of education: Not on file   • Highest education level: Not on file   Tobacco Use   • Smoking status: Never Smoker   • Smokeless tobacco: Never Used   Substance and Sexual Activity   • Alcohol use: Defer     Comment: denied   • Drug use: No   • Sexual activity: Defer       Family History   Problem  "Relation Age of Onset   • Diabetes Mother    • Stroke Mother    • Cancer Father    • Cancer Sister    • Diabetes Brother    • Cancer Brother    • Other Brother         ACUTE MYOCARDIAL INFARCTION       Review of Systems   Constitutional: Positive for fatigue (tired all the time ). Negative for chills and fever.   HENT: Negative.  Negative for congestion, rhinorrhea and sore throat.    Eyes: Positive for visual disturbance (glasses daily ).   Respiratory: Positive for apnea (uses cpap nightly ), cough, chest tightness (increased chest pressure in the last month ) and shortness of breath (SOA at rest ).    Cardiovascular: Positive for chest pain. Negative for palpitations and leg swelling.   Gastrointestinal: Positive for nausea. Negative for abdominal pain, blood in stool and vomiting.   Endocrine: Negative.  Negative for cold intolerance and heat intolerance.   Genitourinary: Negative.  Negative for dysuria, frequency, hematuria and urgency.   Musculoskeletal: Positive for arthralgias (joints ), back pain (low back pain ) and neck pain.   Skin: Negative.  Negative for rash and wound.   Allergic/Immunologic: Negative.  Negative for environmental allergies and food allergies.   Neurological: Positive for weakness (\"severe\" weakness in legs for a few months ) and light-headedness (occasionally at rest and when standing too fast. Worse when BP is lower ). Negative for dizziness.   Hematological: Bruises/bleeds easily (bruises easily ).   Psychiatric/Behavioral: Negative for agitation, confusion and sleep disturbance (denies waking with smothering ). The patient is nervous/anxious (easily anxious ).        Objective   Vitals:    12/10/19 0944   BP: 99/64   BP Location: Left arm   Patient Position: Sitting   Pulse: 84   SpO2: 94%   Weight: 97.5 kg (215 lb)   Height: 175.3 cm (69\")      BP 99/64 (BP Location: Left arm, Patient Position: Sitting)   Pulse 84   Ht 175.3 cm (69\")   Wt 97.5 kg (215 lb)   SpO2 94%   BMI " 31.75 kg/m²    Lab Results (most recent)     None        Physical Exam   Constitutional: He is oriented to person, place, and time. He appears well-developed and well-nourished. No distress.   HENT:   Head: Normocephalic and atraumatic.   Eyes: Pupils are equal, round, and reactive to light. Conjunctivae and EOM are normal.   Neck: Normal range of motion. Neck supple. No JVD present. No tracheal deviation present.   Cardiovascular: Normal rate, regular rhythm and intact distal pulses.   Murmur heard.   Systolic (Second RICS and LLSB.) murmur is present with a grade of 1/6.  Pulses:       Dorsalis pedis pulses are 1+ on the right side, and 1+ on the left side.        Posterior tibial pulses are 1+ on the right side, and 1+ on the left side.   Pulmonary/Chest: Effort normal and breath sounds normal.   Abdominal: Soft. Bowel sounds are normal. He exhibits no distension and no mass. There is no tenderness. There is no rebound and no guarding.   Musculoskeletal: Normal range of motion. He exhibits edema. He exhibits no tenderness or deformity.   Neurological: He is alert and oriented to person, place, and time.   Skin: Skin is warm and dry. No rash noted. No erythema. No pallor.   Psychiatric: He has a normal mood and affect. His behavior is normal. Judgment and thought content normal.   Nursing note and vitals reviewed.        Procedure     ECG 12 Lead  Date/Time: 12/10/2019 10:00 AM  Performed by: Huy Seals PA  Authorized by: Huy Seals PA   Comparison: compared with previous ECG from 11/6/2018  Comparison to previous ECG: Atrial pacing with ventricular sensing, prominent voltage, nonspecific ST and T wave changes, no acute changes noted.                 Assessment/Plan      Diagnosis Plan   1. Coronary artery disease of native artery of native heart with stable angina pectoris (CMS/HCC)  Adult Transthoracic Echo Complete W/ Cont if Necessary Per Protocol    Stress Test With Myocardial Perfusion One Day     CBC & Differential    Comprehensive Metabolic Panel    TSH    XR Chest PA & Lateral    Duplex Carotid Ultrasound CAR   2. Paroxysmal atrial fibrillation (CMS/HCC)  ECG 12 Lead    Adult Transthoracic Echo Complete W/ Cont if Necessary Per Protocol    Stress Test With Myocardial Perfusion One Day    CBC & Differential    Comprehensive Metabolic Panel    TSH    XR Chest PA & Lateral    Duplex Carotid Ultrasound CAR   3. Dizziness  Adult Transthoracic Echo Complete W/ Cont if Necessary Per Protocol    Stress Test With Myocardial Perfusion One Day    CBC & Differential    Comprehensive Metabolic Panel    TSH    XR Chest PA & Lateral    Duplex Carotid Ultrasound CAR   4. Shortness of breath  Adult Transthoracic Echo Complete W/ Cont if Necessary Per Protocol    Stress Test With Myocardial Perfusion One Day    CBC & Differential    Comprehensive Metabolic Panel    TSH    XR Chest PA & Lateral    Duplex Carotid Ultrasound CAR   5. Bilateral carotid bruits       1.  With chest discomfort, shortness of air, and numerous ongoing issues otherwise, the patient will need consideration for nuclear stress test.  We need to ensure no evidence of ischemia contributing to symptoms.    2.  I am concerned about his hypotension and his symptoms otherwise.  Symptoms may be related to hypotension, but I feel that we need to evaluate him structurally.  We need to reevaluate LV systolic function, aortic valve replacement, valvular anatomy otherwise, his pericardium, etc.  We can evaluate that via echo which will be scheduled at the time of stress testing as above.    3.  Also because of his hypotension and symptoms, he will be scheduled for laboratories as above.  I also would like to get a chest x-ray just to evaluate chest anatomy.    4.  For now, I would continue medications.  We do not have anything that we can decrease in terms of hypertensive medications.  He takes nothing for blood pressure control.  He takes no diuretic therapy.   As well I feel that it is imperative to screen for anemia, thyroid dysfunction, etc. as above.  He will monitor blood pressures closely at home.  If needed, he will call immediately for ongoing hypotensive issues.    5.  We will see him back as soon as we have results of all the above.  He will call for any issues prior to follow-up.              Patient's Body mass index is 31.75 kg/m². BMI is above normal parameters. Recommendations include: educational material and referral to primary care.             Electronically signed by:

## 2019-12-11 NOTE — TELEPHONE ENCOUNTER
----- Message from Erika Chapa MA sent at 12/10/2019  5:08 PM EST -----      ----- Message -----  From: Huy Seals PA  Sent: 12/10/2019   4:45 PM EST  To: Erika Chapa MA    Nothing further from Chest x-ray.  Let patient know.

## 2019-12-11 NOTE — TELEPHONE ENCOUNTER
Called and notified Marely, Patients wife, of  Chest ex-ray results. And for pt to keep his follow up appts. GMB;RICARDOA

## 2020-01-01 ENCOUNTER — OFFICE VISIT (OUTPATIENT)
Dept: CARDIOLOGY | Facility: CLINIC | Age: 85
End: 2020-01-01

## 2020-01-01 ENCOUNTER — TELEPHONE (OUTPATIENT)
Dept: CARDIOLOGY | Facility: CLINIC | Age: 85
End: 2020-01-01

## 2020-01-01 ENCOUNTER — ANTICOAGULATION VISIT (OUTPATIENT)
Dept: CARDIOLOGY | Facility: CLINIC | Age: 85
End: 2020-01-01

## 2020-01-01 ENCOUNTER — LAB (OUTPATIENT)
Dept: LAB | Facility: HOSPITAL | Age: 85
End: 2020-01-01

## 2020-01-01 VITALS
WEIGHT: 198 LBS | HEIGHT: 69 IN | DIASTOLIC BLOOD PRESSURE: 55 MMHG | BODY MASS INDEX: 29.33 KG/M2 | HEART RATE: 91 BPM | TEMPERATURE: 97.1 F | OXYGEN SATURATION: 97 % | SYSTOLIC BLOOD PRESSURE: 86 MMHG

## 2020-01-01 DIAGNOSIS — I48.0 PAROXYSMAL ATRIAL FIBRILLATION (HCC): Primary | ICD-10-CM

## 2020-01-01 DIAGNOSIS — I48.0 PAROXYSMAL ATRIAL FIBRILLATION (HCC): ICD-10-CM

## 2020-01-01 DIAGNOSIS — Z95.2 S/P AVR (AORTIC VALVE REPLACEMENT): ICD-10-CM

## 2020-01-01 DIAGNOSIS — K92.1 HEMATOCHEZIA: ICD-10-CM

## 2020-01-01 DIAGNOSIS — Z95.2 AORTIC VALVE REPLACED: ICD-10-CM

## 2020-01-01 DIAGNOSIS — R06.02 SHORTNESS OF BREATH: ICD-10-CM

## 2020-01-01 DIAGNOSIS — Z95.0 PACEMAKER: ICD-10-CM

## 2020-01-01 LAB
AORTIC DIMENSIONLESS INDEX: 0.6 (DI)
BASOPHILS # BLD AUTO: 0 X10E3/UL (ref 0–0.2)
BASOPHILS # BLD AUTO: 0.01 10*3/MM3 (ref 0–0.2)
BASOPHILS NFR BLD AUTO: 0 %
BASOPHILS NFR BLD AUTO: 0.1 % (ref 0–1.5)
BH CV ECHO MEAS - AO MAX PG (FULL): 7.5 MMHG
BH CV ECHO MEAS - AO MAX PG: 10.9 MMHG
BH CV ECHO MEAS - AO MEAN PG (FULL): 5 MMHG
BH CV ECHO MEAS - AO MEAN PG: 6 MMHG
BH CV ECHO MEAS - AO ROOT AREA (BSA CORRECTED): 1.3
BH CV ECHO MEAS - AO ROOT AREA: 6.2 CM^2
BH CV ECHO MEAS - AO ROOT DIAM: 2.8 CM
BH CV ECHO MEAS - AO V2 MAX: 165 CM/SEC
BH CV ECHO MEAS - AO V2 MEAN: 109 CM/SEC
BH CV ECHO MEAS - AO V2 VTI: 36.1 CM
BH CV ECHO MEAS - AVA(I,A): 1.6 CM^2
BH CV ECHO MEAS - AVA(I,D): 1.6 CM^2
BH CV ECHO MEAS - AVA(V,A): 1.8 CM^2
BH CV ECHO MEAS - AVA(V,D): 1.8 CM^2
BH CV ECHO MEAS - BSA(HAYCOCK): 2.2 M^2
BH CV ECHO MEAS - BSA(HAYCOCK): 2.2 M^2
BH CV ECHO MEAS - BSA: 2.1 M^2
BH CV ECHO MEAS - BSA: 2.1 M^2
BH CV ECHO MEAS - BZI_BMI: 31.6 KILOGRAMS/M^2
BH CV ECHO MEAS - BZI_BMI: 32.3 KILOGRAMS/M^2
BH CV ECHO MEAS - BZI_METRIC_HEIGHT: 175.3 CM
BH CV ECHO MEAS - BZI_METRIC_HEIGHT: 175.3 CM
BH CV ECHO MEAS - BZI_METRIC_WEIGHT: 97.1 KG
BH CV ECHO MEAS - BZI_METRIC_WEIGHT: 99.3 KG
BH CV ECHO MEAS - EDV(CUBED): 78.4 ML
BH CV ECHO MEAS - EDV(TEICH): 82.2 ML
BH CV ECHO MEAS - EF(CUBED): 72.9 %
BH CV ECHO MEAS - EF(TEICH): 65 %
BH CV ECHO MEAS - ESV(CUBED): 21.3 ML
BH CV ECHO MEAS - ESV(TEICH): 28.8 ML
BH CV ECHO MEAS - FS: 35.3 %
BH CV ECHO MEAS - IVS/LVPW: 1
BH CV ECHO MEAS - IVSD: 1 CM
BH CV ECHO MEAS - LA DIMENSION: 4 CM
BH CV ECHO MEAS - LA/AO: 1.4
BH CV ECHO MEAS - LAT PEAK E' VEL: 5.8 CM/SEC
BH CV ECHO MEAS - LV IVRT: 0.09 SEC
BH CV ECHO MEAS - LV MASS(C)D: 145.4 GRAMS
BH CV ECHO MEAS - LV MASS(C)DI: 67.7 GRAMS/M^2
BH CV ECHO MEAS - LV MAX PG: 3.4 MMHG
BH CV ECHO MEAS - LV MEAN PG: 1 MMHG
BH CV ECHO MEAS - LV V1 MAX: 92 CM/SEC
BH CV ECHO MEAS - LV V1 MEAN: 52.1 CM/SEC
BH CV ECHO MEAS - LV V1 VTI: 18.5 CM
BH CV ECHO MEAS - LVIDD: 4.3 CM
BH CV ECHO MEAS - LVIDS: 2.8 CM
BH CV ECHO MEAS - LVOT AREA (M): 3.1 CM^2
BH CV ECHO MEAS - LVOT AREA: 3.1 CM^2
BH CV ECHO MEAS - LVOT DIAM: 2 CM
BH CV ECHO MEAS - LVPWD: 1 CM
BH CV ECHO MEAS - MED PEAK E' VEL: 5.5 CM/SEC
BH CV ECHO MEAS - MV DEC SLOPE: 362 CM/SEC^2
BH CV ECHO MEAS - MV DEC TIME: 0.3 SEC
BH CV ECHO MEAS - MV E MAX VEL: 95.4 CM/SEC
BH CV ECHO MEAS - MV MAX PG: 3.3 MMHG
BH CV ECHO MEAS - MV MEAN PG: 1 MMHG
BH CV ECHO MEAS - MV P1/2T MAX VEL: 104 CM/SEC
BH CV ECHO MEAS - MV P1/2T: 84.1 MSEC
BH CV ECHO MEAS - MV V2 MAX: 90.4 CM/SEC
BH CV ECHO MEAS - MV V2 MEAN: 47.5 CM/SEC
BH CV ECHO MEAS - MV V2 VTI: 17.4 CM
BH CV ECHO MEAS - MVA P1/2T LCG: 2.1 CM^2
BH CV ECHO MEAS - MVA(P1/2T): 2.6 CM^2
BH CV ECHO MEAS - MVA(VTI): 3.3 CM^2
BH CV ECHO MEAS - PA MAX PG (FULL): 0.15 MMHG
BH CV ECHO MEAS - PA MAX PG: 1.8 MMHG
BH CV ECHO MEAS - PA MEAN PG (FULL): 0 MMHG
BH CV ECHO MEAS - PA MEAN PG: 1 MMHG
BH CV ECHO MEAS - PA V2 MAX: 67.4 CM/SEC
BH CV ECHO MEAS - PA V2 MEAN: 47.7 CM/SEC
BH CV ECHO MEAS - PA V2 VTI: 10.5 CM
BH CV ECHO MEAS - RAP SYSTOLE: 10 MMHG
BH CV ECHO MEAS - RV MAX PG: 1.7 MMHG
BH CV ECHO MEAS - RV MEAN PG: 1 MMHG
BH CV ECHO MEAS - RV V1 MAX: 64.6 CM/SEC
BH CV ECHO MEAS - RV V1 MEAN: 42.5 CM/SEC
BH CV ECHO MEAS - RV V1 VTI: 12.5 CM
BH CV ECHO MEAS - RVDD: 2.4 CM
BH CV ECHO MEAS - RVSP: 14 MMHG
BH CV ECHO MEAS - SI(AO): 103.5 ML/M^2
BH CV ECHO MEAS - SI(CUBED): 26.6 ML/M^2
BH CV ECHO MEAS - SI(LVOT): 27.1 ML/M^2
BH CV ECHO MEAS - SI(TEICH): 24.9 ML/M^2
BH CV ECHO MEAS - SV(AO): 222.3 ML
BH CV ECHO MEAS - SV(CUBED): 57.1 ML
BH CV ECHO MEAS - SV(LVOT): 58.1 ML
BH CV ECHO MEAS - SV(TEICH): 53.4 ML
BH CV ECHO MEAS - TR MAX VEL: 98.5 CM/SEC
BH CV ECHO MEASUREMENTS AVERAGE E/E' RATIO: 16.88
BH CV XLRA MEAS LEFT BULB EDV: -21.1 CM/SEC
BH CV XLRA MEAS LEFT BULB PSV: -77.8 CM/SEC
BH CV XLRA MEAS LEFT CCA RATIO VEL: 89.3 CM/SEC
BH CV XLRA MEAS LEFT DIST CCA EDV: 24.3 CM/SEC
BH CV XLRA MEAS LEFT DIST CCA PSV: 89.9 CM/SEC
BH CV XLRA MEAS LEFT DIST ICA EDV: -52.8 CM/SEC
BH CV XLRA MEAS LEFT DIST ICA PSV: -110 CM/SEC
BH CV XLRA MEAS LEFT ICA RATIO VEL: -120 CM/SEC
BH CV XLRA MEAS LEFT ICA/CCA RATIO: -1.3
BH CV XLRA MEAS LEFT MID ICA EDV: -47.4 CM/SEC
BH CV XLRA MEAS LEFT MID ICA PSV: -120 CM/SEC
BH CV XLRA MEAS LEFT PROX CCA EDV: 23.2 CM/SEC
BH CV XLRA MEAS LEFT PROX CCA PSV: 101 CM/SEC
BH CV XLRA MEAS LEFT PROX ECA EDV: -1.1 CM/SEC
BH CV XLRA MEAS LEFT PROX ECA PSV: -108 CM/SEC
BH CV XLRA MEAS LEFT PROX ICA EDV: -45.9 CM/SEC
BH CV XLRA MEAS LEFT PROX ICA PSV: -116.9 CM/SEC
BH CV XLRA MEAS LEFT VERTEBRAL A EDV: 10.7 CM/SEC
BH CV XLRA MEAS LEFT VERTEBRAL A PSV: 35.7 CM/SEC
BH CV XLRA MEAS RIGHT BULB EDV: -16.8 CM/SEC
BH CV XLRA MEAS RIGHT BULB PSV: -40.8 CM/SEC
BH CV XLRA MEAS RIGHT CCA RATIO VEL: 56.7 CM/SEC
BH CV XLRA MEAS RIGHT DIST CCA EDV: 16.8 CM/SEC
BH CV XLRA MEAS RIGHT DIST CCA PSV: 56.7 CM/SEC
BH CV XLRA MEAS RIGHT DIST ICA EDV: -38.5 CM/SEC
BH CV XLRA MEAS RIGHT DIST ICA PSV: -100 CM/SEC
BH CV XLRA MEAS RIGHT ICA RATIO VEL: -123 CM/SEC
BH CV XLRA MEAS RIGHT ICA/CCA RATIO: -2.2
BH CV XLRA MEAS RIGHT MID ICA EDV: -37.1 CM/SEC
BH CV XLRA MEAS RIGHT MID ICA PSV: -123 CM/SEC
BH CV XLRA MEAS RIGHT PROX CCA EDV: 16.5 CM/SEC
BH CV XLRA MEAS RIGHT PROX CCA PSV: 79.1 CM/SEC
BH CV XLRA MEAS RIGHT PROX ECA EDV: 0 CM/SEC
BH CV XLRA MEAS RIGHT PROX ECA PSV: -108 CM/SEC
BH CV XLRA MEAS RIGHT PROX ICA EDV: -39.2 CM/SEC
BH CV XLRA MEAS RIGHT PROX ICA PSV: -116 CM/SEC
BH CV XLRA MEAS RIGHT VERTEBRAL A EDV: 8.1 CM/SEC
BH CV XLRA MEAS RIGHT VERTEBRAL A PSV: 29.5 CM/SEC
DEPRECATED RDW RBC AUTO: 59.7 FL (ref 37–54)
EOSINOPHIL # BLD AUTO: 0.07 10*3/MM3 (ref 0–0.4)
EOSINOPHIL # BLD AUTO: 0.1 X10E3/UL (ref 0–0.4)
EOSINOPHIL NFR BLD AUTO: 0.5 % (ref 0.3–6.2)
EOSINOPHIL NFR BLD AUTO: 1 %
ERYTHROCYTE [DISTWIDTH] IN BLOOD BY AUTOMATED COUNT: 15.5 % (ref 11.6–15.4)
ERYTHROCYTE [DISTWIDTH] IN BLOOD BY AUTOMATED COUNT: 16.9 % (ref 12.3–15.4)
HCT VFR BLD AUTO: 30.6 % (ref 37.5–51)
HCT VFR BLD AUTO: 36.2 % (ref 37.5–51)
HGB BLD-MCNC: 10.1 G/DL (ref 13–17.7)
HGB BLD-MCNC: 10.8 G/DL (ref 13–17.7)
IMM GRANULOCYTES # BLD AUTO: 0 X10E3/UL (ref 0–0.1)
IMM GRANULOCYTES # BLD AUTO: 0.06 10*3/MM3 (ref 0–0.05)
IMM GRANULOCYTES NFR BLD AUTO: 0 %
IMM GRANULOCYTES NFR BLD AUTO: 0.5 % (ref 0–0.5)
INR PPP: 1.61 (ref 0.9–1.1)
INR PPP: 2.4 (ref 0.8–1.2)
INR PPP: 2.4 (ref 2–3)
INR PPP: 3.2 (ref 0.8–1.2)
INR PPP: 3.2 (ref 2–3)
INR PPP: 3.6 (ref 0.8–1.2)
INR PPP: 3.6 (ref 2–3)
INR PPP: 4.1 (ref 0.8–1.2)
INR PPP: 4.1 (ref 2–3)
INR PPP: 4.2 (ref 0.8–1.2)
INR PPP: 4.2 (ref 2–3)
INR PPP: 7.3 (ref 0.8–1.2)
LYMPHOCYTES # BLD AUTO: 1.8 X10E3/UL (ref 0.7–3.1)
LYMPHOCYTES # BLD AUTO: 2.1 10*3/MM3 (ref 0.7–3.1)
LYMPHOCYTES NFR BLD AUTO: 15.9 % (ref 19.6–45.3)
LYMPHOCYTES NFR BLD AUTO: 19 %
MAXIMAL PREDICTED HEART RATE: 136 BPM
MCH RBC QN AUTO: 28.8 PG (ref 26.6–33)
MCH RBC QN AUTO: 31.2 PG (ref 26.6–33)
MCHC RBC AUTO-ENTMCNC: 29.8 G/DL (ref 31.5–35.7)
MCHC RBC AUTO-ENTMCNC: 33 G/DL (ref 31.5–35.7)
MCV RBC AUTO: 94 FL (ref 79–97)
MCV RBC AUTO: 96.5 FL (ref 79–97)
MONOCYTES # BLD AUTO: 1.2 X10E3/UL (ref 0.1–0.9)
MONOCYTES # BLD AUTO: 1.61 10*3/MM3 (ref 0.1–0.9)
MONOCYTES NFR BLD AUTO: 12.2 % (ref 5–12)
MONOCYTES NFR BLD AUTO: 13 %
NEUTROPHILS # BLD AUTO: 6.3 X10E3/UL (ref 1.4–7)
NEUTROPHILS # BLD AUTO: 9.39 10*3/MM3 (ref 1.7–7)
NEUTROPHILS NFR BLD AUTO: 67 %
NEUTROPHILS NFR BLD AUTO: 70.8 % (ref 42.7–76)
NRBC BLD AUTO-RTO: 0 /100 WBC (ref 0–0.2)
PLATELET # BLD AUTO: 242 10*3/MM3 (ref 140–450)
PLATELET # BLD AUTO: 342 X10E3/UL (ref 150–450)
PMV BLD AUTO: 9.4 FL (ref 6–12)
PROTHROMBIN TIME: 18.9 SECONDS (ref 11.9–14.1)
PROTHROMBIN TIME: 23.9 SEC (ref 9.1–12)
PROTHROMBIN TIME: 31.5 SEC (ref 9.1–12)
PROTHROMBIN TIME: 35.2 SEC (ref 9.1–12)
PROTHROMBIN TIME: 40.2 SEC (ref 9.1–12)
PROTHROMBIN TIME: 41.1 SEC (ref 9.1–12)
PROTHROMBIN TIME: 68.8 SEC (ref 9.1–12)
RBC # BLD AUTO: 3.24 X10E6/UL (ref 4.14–5.8)
RBC # BLD AUTO: 3.75 10*6/MM3 (ref 4.14–5.8)
STRESS TARGET HR: 116 BPM
WBC # BLD AUTO: 9.4 X10E3/UL (ref 3.4–10.8)
WBC NRBC COR # BLD: 13.24 10*3/MM3 (ref 3.4–10.8)

## 2020-01-01 PROCEDURE — 85025 COMPLETE CBC W/AUTO DIFF WBC: CPT | Performed by: PHYSICIAN ASSISTANT

## 2020-01-01 PROCEDURE — 85610 PROTHROMBIN TIME: CPT | Performed by: PHYSICIAN ASSISTANT

## 2020-01-01 PROCEDURE — 36415 COLL VENOUS BLD VENIPUNCTURE: CPT

## 2020-01-01 PROCEDURE — 99213 OFFICE O/P EST LOW 20 MIN: CPT | Performed by: PHYSICIAN ASSISTANT

## 2020-01-01 RX ORDER — APIXABAN 5 MG/1
5 TABLET, FILM COATED ORAL 2 TIMES DAILY
COMMUNITY
Start: 2020-01-01 | End: 2020-01-01 | Stop reason: SDUPTHER

## 2020-01-01 RX ORDER — CLOPIDOGREL BISULFATE 75 MG/1
75 TABLET ORAL DAILY
COMMUNITY
Start: 2020-01-01 | End: 2020-01-01 | Stop reason: SDUPTHER

## 2020-01-01 RX ORDER — ROSUVASTATIN CALCIUM 20 MG/1
20 TABLET, COATED ORAL DAILY
COMMUNITY
Start: 2020-01-01 | End: 2020-01-01 | Stop reason: SDUPTHER

## 2020-01-01 RX ORDER — ROSUVASTATIN CALCIUM 20 MG/1
20 TABLET, COATED ORAL DAILY
Qty: 90 TABLET | Refills: 3 | Status: SHIPPED | OUTPATIENT
Start: 2020-01-01

## 2020-01-01 RX ORDER — CLOPIDOGREL BISULFATE 75 MG/1
75 TABLET ORAL DAILY
Qty: 90 TABLET | Refills: 3 | Status: SHIPPED | OUTPATIENT
Start: 2020-01-01

## 2020-01-01 RX ORDER — APIXABAN 5 MG/1
5 TABLET, FILM COATED ORAL 2 TIMES DAILY
Qty: 30 TABLET | Refills: 5 | Status: SHIPPED | OUTPATIENT
Start: 2020-01-01

## 2020-01-03 NOTE — TELEPHONE ENCOUNTER
Called pt to go over results of test and see about setting up a routine follow up appt. No answer. Left VM for pt to give us a call back.  REUBENB:MADELYN

## 2020-01-06 NOTE — TELEPHONE ENCOUNTER
----- Message from VALARIE Lora sent at 1/6/2020  8:51 AM EST -----  Routine follow-up.  Please contact patient with results.  Aortic valve appears very much stable.

## 2020-01-07 NOTE — TELEPHONE ENCOUNTER
Result Notes for Adult Transthoracic Echo Complete W/ Cont if Necessary Per Protocol     Notes recorded by Huy Seals PA on 1/6/2020 at 8:51 AM EST  Routine follow-up.  Please contact patient with results.  Aortic valve appears very much stable.   Adult Transthoracic Echo Complete W/ Cont if Necessary Per Protocol   Order: [id number]   Status:  Final result   Visible to patient:  No (Not Released) Dx:  Shortness of breath; Dizziness; Parox...   Details     Reading Physician Reading Date Result Priority   Geovany Tuttle MD 12/18/2019 Routine      Result Text     Moderately to severely technically limited study.  Only gross generalizations can be rendered.     1.  Global LV systolic function is not critically depressed.  Wall motion analysis cannot be performed.  There is some degree of left ventricular hypertrophy present with grade 2 diastolic dysfunction and mild left atrial enlargement.  Right heart chambers are grossly normal.  Patient defibrillator leads are seen in the right heart.     2.  The mitral valve is unremarkable.  The aortic valve has the appearance of a bioprosthesis and very limited imaging.  Aortic valve area is 1.6 cm² or greater there is no significant AI.  Trivial to mild TR.     3.  There is no large pericardial effusion.  The proximal great vessels are not well visualized.     4.  Normal pulmonary pressures.           Result Notes for Duplex Carotid Ultrasound CAR     Notes recorded by Huy Seals PA on 1/6/2020 at 8:41 AM EST  Routine follow-up.   Duplex Carotid Ultrasound CAR   Order: [id number]   Status:  Final result   Visible to patient:  No (Not Released) Dx:  Shortness of breath; Dizziness; Parox...   Details     Reading Physician Reading Date Result Priority   Geovany Tuttle MD 12/18/2019 Routine      Result Text     1.  Both common carotid arteries demonstrate very mild nonobstructive plaque with no flow limitation by echo or Doppler sampling.     2.  Mild  bifurcation disease bilaterally without significant stenosis by either echo or Doppler criteria.     3.  16 to 49% stenosis in both internal carotid arteries.     4.  Antegrade flow in both vertebral arteries.     Summary: Mild nonobstructive carotid disease bilaterally with no flow-limiting stenoses by echo or Doppler sampling.  Antegrade flow both vertebral arteries.         Result Notes for Stress Test With Myocardial Perfusion One Day     Notes recorded by Huy Seals PA on 1/2/2020 at 3:05 PM EST  Routine follow-up.   Stress Test With Myocardial Perfusion One Day   Order: [id number]   Status:  Final result   Visible to patient:  No (Not Released) Dx:  Shortness of breath; Dizziness; Parox...   Details     Reading Physician Reading Date Result Priority   Geovany Tuttle MD 12/18/2019 Routine   Geovany Tuttle MD 12/18/2019    Kenisha Salinas APRN 12/18/2019       Result Text     1.  No scintigraphic evidence of ischemia.     2.  Preserved post stress ejection fraction of 60% with no focal wall motion abnormalities.     3.  No evidence of pharmacologically induced ischemic dilation or increased lung uptake of radiopharmaceutical.              Called pt, to go over test results. Have left several Vm's. Pt has not returned calls. Will go over pt results if they call in or their next routine appt. REUBENB;CARLOS

## 2020-01-07 NOTE — TELEPHONE ENCOUNTER
Pt called requesting results, Informed pt of the message from Huy and went over his echo and stress results. Pt verbalized understanding and stated he would call back to schedule as he's currently in [place].

## 2020-01-08 NOTE — TELEPHONE ENCOUNTER
Pt called back stating that he spoke w/ me yesterday and was given his results from [facility] saying his aortic valve was stable and appt was needed. States that he got called again last night and is returning that call.     Per chart review, looks like I gave pt all of his results and pt is still in [place] and unable to schedule appt until he returns home. Pt stated he will call back once he's able to schedule.

## 2020-01-16 NOTE — TELEPHONE ENCOUNTER
[facility] in [place] called stating pt is there and they need a standing PT-INR order faxed over ASAP, as pt is in their office.   Fax #[phone]        Faxed standing order to [phone] as requested.

## 2020-04-28 NOTE — TELEPHONE ENCOUNTER
INR 4.2. HAS BEEN OFF ABX'S FOR A MONTH, NO CHANGES IN DIET, OR NEW MEDS. PER LINDSEY NARAYAN ONLY TAKE 1/2 TAB OF 7.5 MG TAB TODAY THEN RESUME PREVIOUS DOSING AND RECHECK LEVEL IN 2 WEEKS. VERBAL ORDERS READ BACK AND VERIFIED BY LINDSEY NARAYAN. PATIENT AWARE, VERBALIZED OK. PH,LPN

## 2020-05-06 NOTE — TELEPHONE ENCOUNTER
"Pt LVM stating that he's in [facility] in [facility] and that the doctor there needs to speak w/ [facility]. Stated he's having a heart valve problem.  Pt's #[phone]      I called pt, he stated that the cardiologist at the hospital wanted to speak w/ our office about valve replacement. Pt states that some of their staff has been really nice and some hasn't been. Stated that they have said several times that they need to get in touch w/ us for info on pt. Pt states that he feels like \"he's in limbo.\"    I informed pt that if one of their doctors wants to talk to our office, they can call us and select option 2 or if they're just wanting records, they can fax us a request. He stated that his nurse seemed \"put out\" and he didn't want to ask her to contact us or fax anything.   I informed pt that I would see what we could do.       Located a number for the hospital, called [phone], spoke w/ Tere whom stated pt is in room 3577, she transferred me to the nurses station. Spoke w/ Nery, she stated that they need anything they'll call, but that she thinks pt may just be antsy. Stated that no one has tried to reach us. I told her to call if they need anything, gave her our number and stated to select option 2 for the . She verbalized understanding.   "

## 2020-05-14 NOTE — TELEPHONE ENCOUNTER
PATIENT MANI WAS JUST RELEASED FROM THE HOSP. IN [place] DAY BEFORE YEST. HAD HEMATOCHEZIA, HAD UPPER AND LOWER SCOPES , NO CAUSE FOUND. STATES HAD BEEN ON HEPARIN IN THE HOSP. AND WAS RESTARTED BACK ON COUMADIN AT 10 MG DAILY FOR 3-4 DAYS WHILE ON HEPARIN. DID TAKE 7.5 MG LAST NIGHT, HIS USUAL DOSE. PER CHAKA YBARRA, PAC ONLY TAKE 1/2 OF A 7.5 MG DOSE TODAY THEN RESUME 7.5 MG DAILY AND RECHECK LEVEL IN 7-10 DAYS. VERBAL ORDERS READ BACK AND VERIFIED BY LINDSEY NARAYAN. PATIENT AWARE, VERBALIZED OK. PH,LPN

## 2020-05-20 NOTE — TELEPHONE ENCOUNTER
VERBAL OK PER CHAKA YBARRA, PAC TO SEND INR STANDING ORDER AND CBC. [facility]      ----- Message from Maria Teresa Gorman sent at 5/20/2020 10:07 AM EDT -----   PT NEEDS STANDING ORDER FOR PT-INR  AND A CBC ORDER SENT TO [facility] IN [place], [place].

## 2020-05-22 NOTE — PROGRESS NOTES
PER CHAKA YBARRA, PAC ONLY TAKE 1/2 OF A 7.5 MG TAB TODAY THEN RESUME PREVIOUS DOSING AND RECHECK LEVEL IN 1 WEEK VERBAL ORDERS READ BACK AND VERIFIED BY LINDSEY NARAYAN. PATIENT AWARE, VERBALIZED OK. PH,LPN

## 2020-05-29 NOTE — TELEPHONE ENCOUNTER
INR 7.3 DRAWN TODAY. PATIENT CONFIRMS TAKING 7.5 MG DAILY, DENIES ANY EXCESSIVE BRUISING OR BLEEDING ANYWHERE. NO RECENT ABX'S. ONLY NEW MEDS IS AN IRON TAB, B/12, PROTONIX, AND RESTARTED TAKING FISH OIL. V/O PER CHAKA YBARRA, LINDSEY TO HOLD COUMADIN X 2 DAYS, THEN CHANGE DOSING TO 1/2 OF A 7.5 MG TAB ON Monday'S AND Friday'S AND 7.5 MG ALL OTHER DAYS, HAVE INR DRAWN ON TUES. SINCE WILL BE TRAVELING BACK FROM [place] ON WED., AND MUST GO TO THE ER FOR ANY BLEEDING ISSUES OR CONCERNS. DISCUSSED ALL OB ABOVE IN DETAIL WITH MR. RHODES AND HE VERBALIZED HE UNDERSTOOD. I ALSO MADE SURE HE TAKES ALL MEASURES TO KEEP FROM FALLING, HEATING HEAD ETC. DUE TO THINNESS OF BLOOD NOW. VERBALIZED HE UNDERSTOOD AND THAT HE IS AWARE TO GO TO THE ER WITH BLEEDING, ISSUES ETC. PH,LPN

## 2020-06-04 NOTE — TELEPHONE ENCOUNTER
"Pt's wife called and told Milagros that she believes pt had a stroke and wants an appt today.       Per chart review, pt was last seen on 12/10/19 and has no follow up scheduled.       I took over call:  She stated that pt needs lab work done today because she thinks he had a light stroke due to him having issues w/ his speech. Stated that it's been an issue x 1 week. I asked if pt was taken to the ER, she stated pt spent 1 week in the hospital in [place] and she believes he had the stroke after he was released from the hospital. She stated again that she wants labs done and pt to see a doctor today. I asked the name of the hospital, she stated [facility] in [place], stated that pt had records. I asked why he was in the hospital, she stated he was so weak he couldn't walk and had to have blood transfusions. Stated that he's still very weak, denies any facial drooping, denies any new body numbness, stated that he's just been having a lot of issues w/ speech x1 week.     Placed pt's wife on hold to speak w/ Huy.   Per Huy, have pt go by Dr. Mejía's office and have Dr. Mejía call him.       When I picked up the line, pt's daughter was on the phone, I informed her that unfortunately I can't speak w/ her because she isn't on the HIPAA. She stated \"I have a man that had a stroke and you aren't going to talk to me?\" I told her that I can't speak w/ her, but that I can speak w/ her mother again. She stated that her mother is in her 80's and that they've \"been on the phone for 20 minutes and you haven't done anything.\" I informed her that I was trying to assist, but that there are rules I have to follow, also stated that I understand that they're all stressed, but I had to speak w/ a provider. I requested to speak w/ her mother. Pt's wife took over call again, I told her that I am trying to help them and that I apologize for any extra frustration, but I have to follow pt's HIPAA. Informed her of the message from Huy " and she stated she would contact Dr. Mejía.

## 2020-06-09 NOTE — PROGRESS NOTES
Patient seen in the office today by LINDSEY Briceno and patient was placed on Eliquis during recent hospitalization. No longer on Coumadin. PH,LPN

## 2020-06-09 NOTE — PATIENT INSTRUCTIONS
"Fat and Cholesterol Restricted Eating Plan  Getting too much fat and cholesterol in your diet may cause health problems. Choosing the right foods helps keep your fat and cholesterol at normal levels. This can keep you from getting certain diseases.  Your doctor may recommend an eating plan that includes:  · Total fat: ______% or less of total calories a day.  · Saturated fat: ______% or less of total calories a day.  · Cholesterol: less than _________mg a day.  · Fiber: ______g a day.  What are tips for following this plan?  Meal planning  · At meals, divide your plate into four equal parts:  ? Fill one-half of your plate with vegetables and green salads.  ? Fill one-fourth of your plate with whole grains.  ? Fill one-fourth of your plate with low-fat (lean) protein foods.  · Eat fish that is high in omega-3 fats at least two times a week. This includes mackerel, tuna, sardines, and salmon.  · Eat foods that are high in fiber, such as whole grains, beans, apples, broccoli, carrots, peas, and barley.  General tips    · Work with your doctor to lose weight if you need to.  · Avoid:  ? Foods with added sugar.  ? Fried foods.  ? Foods with partially hydrogenated oils.  · Limit alcohol intake to no more than 1 drink a day for nonpregnant women and 2 drinks a day for men. One drink equals 12 oz of beer, 5 oz of wine, or 1½ oz of hard liquor.  Reading food labels  · Check food labels for:  ? Trans fats.  ? Partially hydrogenated oils.  ? Saturated fat (g) in each serving.  ? Cholesterol (mg) in each serving.  ? Fiber (g) in each serving.  · Choose foods with healthy fats, such as:  ? Monounsaturated fats.  ? Polyunsaturated fats.  ? Omega-3 fats.  · Choose grain products that have whole grains. Look for the word \"whole\" as the first word in the ingredient list.  Cooking  · Cook foods using low-fat methods. These include baking, boiling, grilling, and broiling.  · Eat more home-cooked foods. Eat at restaurants and buffets " less often.  · Avoid cooking using saturated fats, such as butter, cream, palm oil, palm kernel oil, and coconut oil.  Recommended foods    Fruits  · All fresh, canned (in natural juice), or frozen fruits.  Vegetables  · Fresh or frozen vegetables (raw, steamed, roasted, or grilled). Green salads.  Grains  · Whole grains, such as whole wheat or whole grain breads, crackers, cereals, and pasta. Unsweetened oatmeal, bulgur, barley, quinoa, or brown rice. Corn or whole wheat flour tortillas.  Meats and other protein foods  · Ground beef (85% or leaner), grass-fed beef, or beef trimmed of fat. Skinless chicken or turkey. Ground chicken or turkey. Pork trimmed of fat. All fish and seafood. Egg whites. Dried beans, peas, or lentils. Unsalted nuts or seeds. Unsalted canned beans. Nut butters without added sugar or oil.  Dairy  · Low-fat or nonfat dairy products, such as skim or 1% milk, 2% or reduced-fat cheeses, low-fat and fat-free ricotta or cottage cheese, or plain low-fat and nonfat yogurt.  Fats and oils  · Tub margarine without trans fats. Light or reduced-fat mayonnaise and salad dressings. Avocado. Olive, canola, sesame, or safflower oils.  The items listed above may not be a complete list of foods and beverages you can eat. Contact a dietitian for more information.  Foods to avoid  Fruits  · Canned fruit in heavy syrup. Fruit in cream or butter sauce. Fried fruit.  Vegetables  · Vegetables cooked in cheese, cream, or butter sauce. Fried vegetables.  Grains  · White bread. White pasta. White rice. Cornbread. Bagels, pastries, and croissants. Crackers and snack foods that contain trans fat and hydrogenated oils.  Meats and other protein foods  · Fatty cuts of meat. Ribs, chicken wings, martell, sausage, bologna, salami, chitterlings, fatback, hot dogs, bratwurst, and packaged lunch meats. Liver and organ meats. Whole eggs and egg yolks. Chicken and turkey with skin. Fried meat.  Dairy  · Whole or 2% milk, cream,  half-and-half, and cream cheese. Whole milk cheeses. Whole-fat or sweetened yogurt. Full-fat cheeses. Nondairy creamers and whipped toppings. Processed cheese, cheese spreads, and cheese curds.  Beverages  · Alcohol. Sugar-sweetened drinks such as sodas, lemonade, and fruit drinks.  Fats and oils  · Butter, stick margarine, lard, shortening, ghee, or martell fat. Coconut, palm kernel, and palm oils.  Sweets and desserts  · Corn syrup, sugars, honey, and molasses. Candy. Jam and jelly. Syrup. Sweetened cereals. Cookies, pies, cakes, donuts, muffins, and ice cream.  The items listed above may not be a complete list of foods and beverages you should avoid. Contact a dietitian for more information.  Summary  · Choosing the right foods helps keep your fat and cholesterol at normal levels. This can keep you from getting certain diseases.  · At meals, fill one-half of your plate with vegetables and green salads.  · Eat high-fiber foods, like whole grains, beans, apples, carrots, peas, and barley.  · Limit added sugar, saturated fats, alcohol, and fried foods.  This information is not intended to replace advice given to you by your health care provider. Make sure you discuss any questions you have with your health care provider.  Document Released: 06/18/2013 Document Revised: 08/21/2019 Document Reviewed: 09/04/2018  Elsevier Patient Education © 2020 [employer]  BMI for Adults    Body mass index (BMI) is a number that is calculated from a person's weight and height. BMI may help to estimate how much of a person's weight is composed of fat. BMI can help identify those who may be at higher risk for certain medical problems.  How is BMI used with adults?  BMI is used as a screening tool to identify possible weight problems. It is used to check whether a person is obese, overweight, healthy weight, or underweight.  How is BMI calculated?  BMI measures your weight and compares it to your height. This can be done either in  "English ([place] or metric measurements. Note that charts are available to help you find your BMI quickly and easily without having to do these calculations yourself.  To calculate your BMI in English (U.S[place] measurements, your health care provider will:  1. Measure your weight in pounds (lb).  2. Multiply the number of pounds by 703.  ? For example, for a person who weighs 180 lb, multiply that number by 703, which equals 126,540.  3. Measure your height in inches (in). Then multiply that number by itself to get a measurement called \"inches squared.\"  ? For example, for a person who is 70 in tall, the \"inches squared\" measurement is 70 in x 70 in, which equals 4900 inches squared.  4. Divide the total from Step 2 (number of lb x 703) by the total from Step 3 (inches squared): 126,540 ÷ 4900 = 25.8. This is your BMI.  To calculate your BMI in metric measurements, your health care provider will:  1. Measure your weight in kilograms (kg).  2. Measure your height in meters (m). Then multiply that number by itself to get a measurement called \"meters squared.\"  ? For example, for a person who is 1.75 m tall, the \"meters squared\" measurement is 1.75 m x 1.75 m, which is equal to 3.1 meters squared.  3. Divide the number of kilograms (your weight) by the meters squared number. In this example: 70 ÷ 3.1 = 22.6. This is your BMI.  How is BMI interpreted?  To interpret your results, your health care provider will use BMI charts to identify whether you are underweight, normal weight, overweight, or obese. The following guidelines will be used:  · Underweight: BMI less than 18.5.  · Normal weight: BMI between 18.5 and 24.9.  · Overweight: BMI between 25 and 29.9.  · Obese: BMI of 30 and above.  Please note:  · Weight includes both fat and muscle, so someone with a muscular build, such as an athlete, may have a BMI that is higher than 24.9. In cases like these, BMI is not an accurate measure of body fat.  · To determine if excess " body fat is the cause of a BMI of 25 or higher, further assessments may need to be done by a health care provider.  · BMI is usually interpreted in the same way for men and women.  Why is BMI a useful tool?  BMI is useful in two ways:  · Identifying a weight problem that may be related to a medical condition, or that may increase the risk for medical problems.  · Promoting lifestyle and diet changes in order to reach a healthy weight.  Summary  · Body mass index (BMI) is a number that is calculated from a person's weight and height.  · BMI may help to estimate how much of a person's weight is composed of fat. BMI can help identify those who may be at higher risk for certain medical problems.  · BMI can be measured using English measurements or metric measurements.  · To interpret your results, your health care provider will use BMI charts to identify whether you are underweight, normal weight, overweight, or obese.  This information is not intended to replace advice given to you by your health care provider. Make sure you discuss any questions you have with your health care provider.  Document Released: 08/29/2005 Document Revised: 11/30/2018 Document Reviewed: 10/31/2018  Elsevier Patient Education © 2020 [employer]

## 2020-06-09 NOTE — PROGRESS NOTES
Problem list     Subjective   Sonido Dodd is a 84 y.o. male     Chief Complaint   Patient presents with   • Coronary Artery Disease     Here for a hospital follow up    • Carotid Artery Disease   Problem List:      1. Coronary artery disease   1.1 Stenting to OM X 2 and LAD, 2007.  2. Atrial fibrillation   2.1 CHADS score of at least 2, patient is on anticoagulation therapy with Coumadin.  3. Conduction system disease   3.1 PPM Dual Chamber [facility] 2/2/16  4. Degenerative disc and joint disease.   5. Hypertension  6. Hyperlipidemia  7. Shortness of breath   7.1 Echo 7/12/17-moderate LVH, EF greater than 65%, diastolic dysfunction 2, aortic VTI 1.2; porcine bioprosthetic aortic valve is present; that was appears to function normally; trace MR  8. Valvular Heart Disease.  8.1 Aortic valve replacement a #21 [facility] Trifecta valve.  The patient had concurrent VG to diagonal placement.   9.  Carotid artery disease      9.1 Nonobstructive carotid artery disease by recent duplex.     HPI  This pleasant patient presents for hospital follow-up.  This gentleman recently returned from [place] where he was spending time with vacation.  He apparently had issues while in [place], but in short had to hold Coumadin.  That was reinstituted but apparently the patient remains subtherapeutic.  He had a CVA process.  He was admitted at [facility] and evaluated for CVA process.  Work-up did suggest and support subtherapeutic INR.  Coumadin was discontinued as there were difficulties maintaining therapeutic INR and the patient was placed on Eliquis at that time, noting porcine aortic valve replacement.  He also had high-grade vertebral artery stenosis bilaterally however neurosurgery recommended no consideration for surgical intervention.  Medical management only was recommended in that regard.  The patient did have an echocardiogram which indicated preserved systolic function, no significant valvular  issues, and no shunt via bubble study.  There was mild elevation in troponin which was felt to represent a type II non-ST elevation MI.  He was discharged and advised to follow-up with us today.  The patient still has a degree of expressive aphasia post CVA.  Otherwise, he reports no chest pain.  He has stable dyspnea.  He has no failure or dysrhythmic symptoms.  He has no further complaints.    Current Outpatient Medications on File Prior to Visit   Medication Sig Dispense Refill   • HYDROcodone-acetaminophen (NORCO)  MG per tablet Take 1 tablet by mouth 3 (Three) Times a Day.  0   • levothyroxine (SYNTHROID, LEVOTHROID) 50 MCG tablet Take 50 mcg by mouth Daily.     • Multiple Vitamin (ONE-A-DAY MENS) tablet Take 1 tablet by mouth daily.     • nitroglycerin (NITROSTAT) 0.4 MG SL tablet 1 under the tongue as needed for angina, may repeat q5mins for up three doses 25 tablet 2   • coenzyme Q10 100 MG capsule Take 200 mg by mouth Daily.     • hydroxychloroquine (PLAQUENIL) 200 MG tablet Take 200 mg by mouth 2 (Two) Times a Day.     • methocarbamol (ROBAXIN) 750 MG tablet Take 750 mg by mouth As Needed for Muscle Spasms.     • metroNIDAZOLE (METROCREAM) 0.75 % cream Take As Directed.  1   • montelukast (SINGULAIR) 10 MG tablet Take 10 mg by mouth Daily.     • raNITIdine (ZANTAC) 75 MG tablet Take 75 mg by mouth 2 (Two) Times a Day.       No current facility-administered medications on file prior to visit.        Ondansetron    Past Medical History:   Diagnosis Date   • Cancer (CMS/[facility])     elizabeth. neoplasm of prostate   • Carotid artery stenosis 06/2020   • Chronic kidney disease    • Coronary artery disease    • Diverticulosis 06/2020   • GERD (gastroesophageal reflux disease)    • Hip discomfort     rt. hip s/p falls, spurs present per patient   • Hyperlipidemia    • Hypertension    • Pulmonary hypertension (CMS/[facility])    • Sleep apnea     uses c-pap   • Stroke (CMS/[facility]) 06/2020   • Transient cerebral ischemia             Social History     Socioeconomic History   • Marital status:      Spouse name: Not on file   • Number of children: Not on file   • Years of education: Not on file   • Highest education level: Not on file   Tobacco Use   • Smoking status: Never Smoker   • Smokeless tobacco: Never Used   Substance and Sexual Activity   • Alcohol use: Defer     Comment: denied   • Drug use: No   • Sexual activity: Defer       Family History   Problem Relation Age of Onset   • Diabetes Mother    • Stroke Mother    • Cancer Father    • Cancer Sister    • Diabetes Brother    • Cancer Brother    • Other Brother         ACUTE MYOCARDIAL INFARCTION       Review of Systems   Constitutional: Positive for fatigue. Negative for chills and fever.   HENT: Negative.  Negative for congestion, rhinorrhea and sore throat.    Eyes: Positive for visual disturbance (glasses daily ).   Respiratory: Positive for apnea (uses a cpap ), cough and shortness of breath. Negative for chest tightness.    Cardiovascular: Negative.  Negative for chest pain, palpitations and leg swelling.        Admitted to [facility] on 6/4/20 with possible stroke. Was diagnosed with severe carotid stenosis.    Gastrointestinal: Negative.  Negative for abdominal pain, blood in stool, nausea and vomiting.   Endocrine: Positive for cold intolerance. Negative for heat intolerance.   Genitourinary: Negative.  Negative for dysuria, frequency, hematuria and urgency.   Musculoskeletal: Positive for arthralgias (joints ), back pain (upper and lower back pain ) and gait problem (uses a walker ).   Skin: Negative.  Negative for rash and wound.   Allergic/Immunologic: Negative.  Negative for environmental allergies and food allergies.   Neurological: Negative for dizziness, weakness and light-headedness.   Hematological: Bruises/bleeds easily.   Psychiatric/Behavioral: Negative.  Negative for agitation, confusion and sleep disturbance (denies waking wtih smothering ). The patient is not  "nervous/anxious.        Objective   Vitals:    06/09/20 1029   BP: (!) 86/55   BP Location: Left arm   Patient Position: Sitting   Pulse: 91   Temp: 97.1 °F (36.2 °C)   SpO2: 97%   Weight: 89.8 kg (198 lb)   Height: 175.3 cm (69\")      BP (!) 86/55 (BP Location: Left arm, Patient Position: Sitting)   Pulse 91   Temp 97.1 °F (36.2 °C)   Ht 175.3 cm (69\")   Wt 89.8 kg (198 lb)   SpO2 97%   BMI 29.24 kg/m²    Lab Results (most recent)     None        Physical Exam   Constitutional: He is oriented to person, place, and time. He appears well-developed and well-nourished. No distress.   HENT:   Head: Normocephalic and atraumatic.   Eyes: Pupils are equal, round, and reactive to light. Conjunctivae and EOM are normal.   Neck: Normal range of motion. Neck supple. No JVD present. No tracheal deviation present.   Cardiovascular: Normal rate, regular rhythm and intact distal pulses.   Murmur heard.   Systolic (Second RICS and LLSB.) murmur is present with a grade of 1/6.  Pulses:       Dorsalis pedis pulses are 1+ on the right side, and 1+ on the left side.        Posterior tibial pulses are 1+ on the right side, and 1+ on the left side.   Pulmonary/Chest: Effort normal and breath sounds normal.   Abdominal: Soft. Bowel sounds are normal. He exhibits no distension and no mass. There is no tenderness. There is no rebound and no guarding.   Musculoskeletal: Normal range of motion. He exhibits edema. He exhibits no tenderness or deformity.   Neurological: He is alert and oriented to person, place, and time.   Skin: Skin is warm and dry. No rash noted. No erythema. No pallor.   Psychiatric: He has a normal mood and affect. His behavior is normal. Judgment and thought content normal.   Nursing note and vitals reviewed.        Procedure   Procedures       Assessment/Plan      Diagnosis Plan   1. Paroxysmal atrial fibrillation (CMS/HCC)     2. Aortic valve replaced     3. Shortness of breath     4. Pacemaker       1.  The " patient is seen in follow-up from recent hospitalization.  He was admitted because of CVA which was felt secondary to subtherapeutic INR.  That was discontinued in favor of Eliquis which he takes now.  He really wants to go back on Coumadin and I have encouraged him that we may make that switch in the future.  I would make no changes for now however.    2.  Recent echocardiogram findings during hospitalization was largely benign for this gentleman at baseline.  I do not feel further work-up would be warranted at this time.    3.  He did have high-grade vertebral artery stenosis bilaterally.  He was seen in consultation by neurosurgery and advised that he was not a candidate for mechanical intervention.  I will defer to that service in that regard.    4.  For now, I will continue medical regimen.  I would make no further changes.  We will follow him closely.  He appears stable for the most part from cardiovascular standpoint at this time.  For any complications, he would call immediately.  Otherwise, we will continue to see him frequently through the clinic.           Sonido HILTON Ju  reports that he has never smoked. He has never used smokeless tobacco..          Patient's Body mass index is 29.24 kg/m². BMI is above normal parameters. Recommendations include: educational material and referral to primary care.         Advance Care Planning   ACP discussion was declined by the patient. Patient has an advance directive (not in EMR), copy requested.      Electronically signed by: